# Patient Record
Sex: MALE | Race: WHITE
[De-identification: names, ages, dates, MRNs, and addresses within clinical notes are randomized per-mention and may not be internally consistent; named-entity substitution may affect disease eponyms.]

---

## 2020-02-04 ENCOUNTER — HOSPITAL ENCOUNTER (EMERGENCY)
Dept: HOSPITAL 1 - ED | Age: 65
Discharge: HOME | End: 2020-02-04
Payer: COMMERCIAL

## 2020-02-04 VITALS — BODY MASS INDEX: 28.9 KG/M2 | HEIGHT: 67 IN

## 2020-02-04 VITALS — DIASTOLIC BLOOD PRESSURE: 68 MMHG | SYSTOLIC BLOOD PRESSURE: 119 MMHG
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
## 2020-02-04 DIAGNOSIS — I50.9: ICD-10-CM

## 2020-02-04 DIAGNOSIS — Z88.0: ICD-10-CM
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
## 2020-02-04 DIAGNOSIS — I11.0: ICD-10-CM

## 2020-02-04 DIAGNOSIS — Z99.2: ICD-10-CM

## 2020-02-04 DIAGNOSIS — L29.9: ICD-10-CM
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
## 2020-02-04 DIAGNOSIS — E78.00: ICD-10-CM

## 2020-02-04 DIAGNOSIS — S90.31XA: Primary | ICD-10-CM
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
## 2020-02-04 DIAGNOSIS — E11.9: ICD-10-CM

## 2020-02-04 LAB
BASOPHILS NFR BLD: 0.2 % (ref 0–2)
BUN SERPL-MCNC: 78 MG/DL (ref 7–18)
CALCIUM SERPL-MCNC: 8 MG/DL (ref 8.5–10.1)
CHLORIDE SERPL-SCNC: 94 MMOL/L (ref 98–107)
CO2 SERPL-SCNC: 18.8 MMOL/L (ref 21–32)
CREAT SERPL-MCNC: 5.3 MG/DL (ref 0.7–1.3)
ERYTHROCYTE [DISTWIDTH] IN BLOOD BY AUTOMATED COUNT: 15.3 % (ref 11.5–14.5)
GFR SERPLBLD BASED ON 1.73 SQ M-ARVRAT: 12 ML/MIN
GLUCOSE SERPL-MCNC: 111 MG/DL (ref 74–106)
PLATELET # BLD: 135 X10^3MCL (ref 130–400)
POTASSIUM SERPL-SCNC: 4.7 MMOL/L (ref 3.5–5.1)
SODIUM SERPL-SCNC: 133 MMOL/L (ref 136–145)

## 2021-01-28 ENCOUNTER — HOSPITAL ENCOUNTER (INPATIENT)
Dept: HOSPITAL 1 - ED | Age: 66
LOS: 20 days | DRG: 870 | End: 2021-02-17
Attending: HOSPITALIST | Admitting: HOSPITALIST
Payer: COMMERCIAL

## 2021-01-28 VITALS
WEIGHT: 194.01 LBS | BODY MASS INDEX: 31.18 KG/M2 | WEIGHT: 194.01 LBS | HEIGHT: 66 IN | BODY MASS INDEX: 31.18 KG/M2 | HEIGHT: 66 IN

## 2021-01-28 VITALS — SYSTOLIC BLOOD PRESSURE: 116 MMHG | DIASTOLIC BLOOD PRESSURE: 74 MMHG

## 2021-01-28 DIAGNOSIS — D72.819: ICD-10-CM

## 2021-01-28 DIAGNOSIS — Z63.5: ICD-10-CM

## 2021-01-28 DIAGNOSIS — Z79.01: ICD-10-CM

## 2021-01-28 DIAGNOSIS — U07.1: ICD-10-CM

## 2021-01-28 DIAGNOSIS — I95.9: ICD-10-CM

## 2021-01-28 DIAGNOSIS — E11.21: ICD-10-CM

## 2021-01-28 DIAGNOSIS — D63.1: ICD-10-CM

## 2021-01-28 DIAGNOSIS — Z99.2: ICD-10-CM

## 2021-01-28 DIAGNOSIS — I45.10: ICD-10-CM

## 2021-01-28 DIAGNOSIS — Z79.4: ICD-10-CM

## 2021-01-28 DIAGNOSIS — R65.20: ICD-10-CM

## 2021-01-28 DIAGNOSIS — E11.22: ICD-10-CM

## 2021-01-28 DIAGNOSIS — Z88.0: ICD-10-CM

## 2021-01-28 DIAGNOSIS — A41.89: Primary | ICD-10-CM

## 2021-01-28 DIAGNOSIS — E44.1: ICD-10-CM

## 2021-01-28 DIAGNOSIS — F17.210: ICD-10-CM

## 2021-01-28 DIAGNOSIS — Z79.899: ICD-10-CM

## 2021-01-28 DIAGNOSIS — J12.82: ICD-10-CM

## 2021-01-28 DIAGNOSIS — N18.6: ICD-10-CM

## 2021-01-28 DIAGNOSIS — E87.1: ICD-10-CM

## 2021-01-28 DIAGNOSIS — N39.0: ICD-10-CM

## 2021-01-28 DIAGNOSIS — E87.6: ICD-10-CM

## 2021-01-28 DIAGNOSIS — Z98.49: ICD-10-CM

## 2021-01-28 DIAGNOSIS — D68.9: ICD-10-CM

## 2021-01-28 DIAGNOSIS — E78.5: ICD-10-CM

## 2021-01-28 DIAGNOSIS — K70.30: ICD-10-CM

## 2021-01-28 DIAGNOSIS — Z83.3: ICD-10-CM

## 2021-01-28 DIAGNOSIS — Z79.891: ICD-10-CM

## 2021-01-28 DIAGNOSIS — E78.00: ICD-10-CM

## 2021-01-28 DIAGNOSIS — Z66: ICD-10-CM

## 2021-01-28 DIAGNOSIS — J96.01: ICD-10-CM

## 2021-01-28 DIAGNOSIS — G93.40: ICD-10-CM

## 2021-01-28 DIAGNOSIS — I13.2: ICD-10-CM

## 2021-01-28 LAB
ALBUMIN SERPL-MCNC: 3 G/DL (ref 3.4–5)
ALP SERPL-CCNC: 67 U/L (ref 46–116)
ALT SERPL-CCNC: 60 U/L (ref 16–63)
AST SERPL-CCNC: 41 U/L (ref 15–37)
BILIRUB SERPL-MCNC: 1.37 MG/DL (ref 0.2–1)
BUN SERPL-MCNC: 18 MG/DL (ref 7–18)
CALCIUM SERPL-MCNC: 8.5 MG/DL (ref 8.5–10.1)
CHLORIDE SERPL-SCNC: 95 MMOL/L (ref 98–107)
CO2 SERPL-SCNC: 32.1 MMOL/L (ref 21–32)
CREAT SERPL-MCNC: 2 MG/DL (ref 0.7–1.3)
CRP SERPL-MCNC: 16.1 MG/DL (ref ?–0.9)
ERYTHROCYTE [DISTWIDTH] IN BLOOD BY AUTOMATED COUNT: 15 % (ref 12.1–16.2)
GFR SERPLBLD BASED ON 1.73 SQ M-ARVRAT: 36 ML/MIN
GLUCOSE SERPL-MCNC: 320 MG/DL (ref 74–106)
LDH SERPL-CCNC: 398 U/L (ref 100–190)
METAMYELOCYTES NFR BLD: 2 % (ref 0–2)
MICROSCOPIC UR-IMP: YES
MONOCYTES NFR BLD: 10 % (ref 0–7)
NEUTS BAND NFR BLD: 5 % (ref 0–10)
NEUTS SEG NFR BLD MANUAL: 69 % (ref 37–75)
PLAT MORPH BLD: (no result)
PLATELET # BLD: 77 X10^3MCL (ref 152–348)
POTASSIUM SERPL-SCNC: 3.2 MMOL/L (ref 3.5–5.1)
PROT SERPL-MCNC: 7.2 G/DL (ref 6.4–8.2)
RBC # UR STRIP.AUTO: NEGATIVE /UL
RBC MORPH BLD: NORMAL
SODIUM SERPL-SCNC: 137 MMOL/L (ref 136–145)
UA SPECIFIC GRAVITY: 1.01 (ref 1–1.03)

## 2021-01-28 PROCEDURE — P9047 ALBUMIN (HUMAN), 25%, 50ML: HCPCS

## 2021-01-28 PROCEDURE — G0378 HOSPITAL OBSERVATION PER HR: HCPCS

## 2021-01-28 PROCEDURE — U0003 INFECTIOUS AGENT DETECTION BY NUCLEIC ACID (DNA OR RNA); SEVERE ACUTE RESPIRATORY SYNDROME CORONAVIRUS 2 (SARS-COV-2) (CORONAVIRUS DISEASE [COVID-19]), AMPLIFIED PROBE TECHNIQUE, MAKING USE OF HIGH THROUGHPUT TECHNOLOGIES AS DESCRIBED BY CMS-2020-01-R: HCPCS

## 2021-01-28 PROCEDURE — A4628 OROPHARYNGEAL SUCTION CATH: HCPCS

## 2021-01-28 SDOH — SOCIAL STABILITY - SOCIAL INSECURITY: DISRUPTION OF FAMILY BY SEPARATION AND DIVORCE: Z63.5

## 2021-01-28 NOTE — NUR
PT LAYING SUPINE IN GURNEY IN A POSITION OF COMFORT WITH EYES CLOSED. PT EASILY
AROUSABLE TO VERBAL STIMULI. PT RESPIRATIONS E/U NO DISTRESS NOTED. PT REMAINS
CONNECTED TO FULL CM WILL CONTINUE TO MONITOR.

## 2021-01-29 VITALS — SYSTOLIC BLOOD PRESSURE: 125 MMHG | DIASTOLIC BLOOD PRESSURE: 69 MMHG

## 2021-01-29 VITALS — SYSTOLIC BLOOD PRESSURE: 148 MMHG | DIASTOLIC BLOOD PRESSURE: 72 MMHG

## 2021-01-29 VITALS — DIASTOLIC BLOOD PRESSURE: 69 MMHG | SYSTOLIC BLOOD PRESSURE: 130 MMHG

## 2021-01-29 VITALS — SYSTOLIC BLOOD PRESSURE: 130 MMHG | DIASTOLIC BLOOD PRESSURE: 62 MMHG

## 2021-01-29 VITALS — SYSTOLIC BLOOD PRESSURE: 113 MMHG | DIASTOLIC BLOOD PRESSURE: 48 MMHG

## 2021-01-29 LAB
BASOPHILS NFR BLD: 0.1 % (ref 0.2–1.5)
BUN SERPL-MCNC: 27 MG/DL (ref 7–18)
CALCIUM SERPL-MCNC: 8.2 MG/DL (ref 8.5–10.1)
CHLORIDE SERPL-SCNC: 98 MMOL/L (ref 98–107)
CO2 SERPL-SCNC: 33.2 MMOL/L (ref 21–32)
CREAT SERPL-MCNC: 2.3 MG/DL (ref 0.7–1.3)
ERYTHROCYTE [DISTWIDTH] IN BLOOD BY AUTOMATED COUNT: 14.9 % (ref 12.1–16.2)
GFR SERPLBLD BASED ON 1.73 SQ M-ARVRAT: 30 ML/MIN
GLUCOSE SERPL-MCNC: 321 MG/DL (ref 74–106)
MAGNESIUM SERPL-MCNC: 1.8 MG/DL (ref 1.8–2.4)
PHOSPHATE SERPL-MCNC: 3.9 MG/DL (ref 2.5–4.9)
PLATELET # BLD: 54 X10^3MCL (ref 152–348)
POTASSIUM SERPL-SCNC: 3.7 MMOL/L (ref 3.5–5.1)
RBC MORPH BLD: (no result)
SODIUM SERPL-SCNC: 139 MMOL/L (ref 136–145)

## 2021-01-29 NOTE — NUR
MONITOR DISPLAYED ELEVATED T WAVE. DR. WILSON AWARE AND CONSULT ORDERED WITH
DR. HOLMAN CARDIOLOGIST. TROPONIN AND ECG ORDER SUBMITED. PENDING RESULTS.

## 2021-01-29 NOTE — NUR
REPORT GIVEN TO NIGHT RN. PATIENT REMAINED STABLE THROUGHOUT MY SHIFT. ALL
QUESTIONS AND CONCERNS ADDRESSED. ALL NEEDS MET AT THIS TIME. ALL CARE IS
ENDORSED.

## 2021-01-29 NOTE — NUR
PT CAME IN WITH WORSENING SOB. PT HAS PRODUCTIVE COUGH. PT A/O X 4. PT ON NRB
AT 15L. PT SKIN IS INTACT. PT AMBULATORY WITH WALKER. PT VOIDS FREELY. PT
COMPLAINS OF DIARRHEA. PT CALL LIGHT ON EASY REACH. PT DENIES ANY PAIN AT THIS
MOMENT. CONTINUE TO MONITOR PT CLOSELY. PT IS ON DIALYSIS X 3 T/TH/SAT.

## 2021-01-29 NOTE — NUR
DR ARAUJO MADE AWARE OF PT HAVING ST ELEVATION. AWAITING ORDERS. ALL
QUESTIONS AND CONCERNS ADDRESSED.

## 2021-01-29 NOTE — NUR
RECEIVED PATIENT FROM NIGHT SHIFT RN. PATIENT IS ON DROPLET PRECAUTUINS DUE TO
(+) COVID. PATIENT A/O X 4 . PT IS ON TELE 24 AND 15L/MIN VIA NRB MASK.
REMAINS STABLE 93 SPO2, HR 93. R.WIRST IV SL. CDI AND PATENT. IN NO APPARENT
DISTRESS OR SOB AT THIS TIME. AL COMFORT AND SAFETY MEASURES IN PLACE. BED IN
LOWEST POSITION, LOCKED, SIDE RAILS UP X 2, AND CALL LIGHT LIGHT WITHIN REACH.
ALL QUESTIONS AND CONCERNS HAVE BEEN ADDRESSED.

## 2021-01-29 NOTE — NUR
PT A/O X 4. PT AWARE FOR CONVALASCENT BT. CONSENT IS SIGNED. PT IS DIALYSIS
PT SHCEDULED FOR T/TH/SAT. PT VS CHECKED AND RECORDED. PT IN NO APPARENT
DISTRESS. PT INSTRUCTED NOT TO MOVE ABRUPTLY TO AVOID WASTING OF O2 RESERVES.
PT IS AMBULATORY WITH WALKER. PT VOIDS FREELY. PT SKIN IS INTACT, PT HAS NO
EDEMA. PT ON TELE # 24 WITH NSR. PT HAS SALINE LOCK AT RIGHT WRIST G20. PT ON
NRB AT 15L. PT HAS LUEFT UPPER ARM AV SHUNT BRUIT AND THRILL ARE PRESENT.
ENDORSED TO INCOMING NURSE.

## 2021-01-29 NOTE — NUR
PATIENT WAS RESTING IN SIDE POSITION. DOING WELL ON 15 L NRB MASK AND 8L/MIN
NC WITH HUMIDIFIER 92 SPO2. DENIES ANY PAIN AT THIS TIME. PATIENT IS
REQUESTING ALLERGY MEDICATION. SPOKE TO SISTER NAHID AND SHE WILL CALL THE
PHARMACY TO CONFIRM RX AND CALL ME BACK WITH INFO. ALL SAFETY MEASURES IN
PLACE. ALL QUESTIONS AND CONCERNS WERE ADDRESSED. BED LOCKED, LOWEST POSITION,
BED RAILS UP X 2, AND CALL LIGHT WITHIN REACH.

## 2021-01-29 NOTE — NUR
PT AAOX4 SITTING UPRIGHT IN BED WATCHING TV.  PT C/O ITCHING. ADVISED RESIDENT
THAT PT REQUESTING ATARAX FOR ITCHING.  PT DIALYSIS PT TTS.  AAOX4.
TELEMONITOR IN USE. NSR NOTED. PULSES PRESENT. NO EDEMA NOTED.  LUNG SOUNDS
DIMINISHED. BOWEL SOUNDS PRESENT.  LAST BM 1/28.  VOIDS PER URINAL.
GENERALIZED WEAKNESS NOTED.  SKIN INTACT.  DENIES PAIN. IV SITE CDI; PATENT.
SAFETY MAINTAINED. BED IN LOWEST POSITION. CALL LIGHT WITHIN REACH. BED SIDE
TABLE NEAR.  WILL CONTINUE TO MONITOR.

## 2021-01-29 NOTE — NUR
REINFORCEMENT PROVIDED FOR INCENTIVE SPIROMETER USE AT BED SIDE. PATIENT
VERBALIZED UNDERSTANDING AND COOPERATED. IN NO APPARENT DISTRESS. DENIES ANY
PAIN. ALL QUESTIONS AND CONCERNS WERE ADDRESSED. BED IN LOCKED, LOWEST
POSITION, BED RAILS RAISED X 2, AND CALL LIGHT WITHIN REACH.

## 2021-01-29 NOTE — NUR
PATIENT EATING A SNACK AND DESATURATED TO 88 O2 ON 15L/MIN VIA NRB MASK.
RT AT BEDSIDE, ASSESSED PATIENT. EDUCATED PATIENT ON EATING SMALL BITES AT
A SLOWER PACE AND KEEPING NRB MASK ON. PATIENT VERBALIZED UNDERSTANDING.
SAFETY MEASURES IN PLACE. BED LOCKED, LOWET POSITION, ARM RAILS RAISED X 2,
AND CALL LIGHT WITHIN REACH. ALL QUESTIONS AND CONCERNS WERE ADDRESSED.

## 2021-01-30 VITALS — SYSTOLIC BLOOD PRESSURE: 130 MMHG | DIASTOLIC BLOOD PRESSURE: 70 MMHG

## 2021-01-30 VITALS — DIASTOLIC BLOOD PRESSURE: 72 MMHG | SYSTOLIC BLOOD PRESSURE: 153 MMHG

## 2021-01-30 VITALS — SYSTOLIC BLOOD PRESSURE: 131 MMHG | DIASTOLIC BLOOD PRESSURE: 66 MMHG

## 2021-01-30 VITALS — DIASTOLIC BLOOD PRESSURE: 50 MMHG | SYSTOLIC BLOOD PRESSURE: 109 MMHG

## 2021-01-30 LAB
BASOPHILS NFR BLD: 0 % (ref 0.2–1.5)
BUN SERPL-MCNC: 65 MG/DL (ref 7–18)
CALCIUM SERPL-MCNC: 7.9 MG/DL (ref 8.5–10.1)
CHLORIDE SERPL-SCNC: 98 MMOL/L (ref 98–107)
CO2 SERPL-SCNC: 28.8 MMOL/L (ref 21–32)
CREAT SERPL-MCNC: 3.5 MG/DL (ref 0.7–1.3)
CRP SERPL-MCNC: 13.1 MG/DL (ref ?–0.9)
ERYTHROCYTE [DISTWIDTH] IN BLOOD BY AUTOMATED COUNT: 14.5 % (ref 12.1–16.2)
GFR SERPLBLD BASED ON 1.73 SQ M-ARVRAT: 19 ML/MIN
GLUCOSE SERPL-MCNC: 233 MG/DL (ref 74–106)
MAGNESIUM SERPL-MCNC: 1.9 MG/DL (ref 1.8–2.4)
PHOSPHATE SERPL-MCNC: 4.2 MG/DL (ref 2.5–4.9)
PLATELET # BLD: 74 X10^3MCL (ref 152–348)
POTASSIUM SERPL-SCNC: 3.9 MMOL/L (ref 3.5–5.1)
RBC MORPH BLD: NORMAL
SODIUM SERPL-SCNC: 135 MMOL/L (ref 136–145)

## 2021-01-30 PROCEDURE — 5A1D70Z PERFORMANCE OF URINARY FILTRATION, INTERMITTENT, LESS THAN 6 HOURS PER DAY: ICD-10-PCS | Performed by: HOSPITALIST

## 2021-01-30 NOTE — NUR
SPOKE TO TASNEEM DIALYSIS RN, 2L OUT AND HE STATED TO MONITOR FOR BLEEDING TO PAUL
ACCESS. ALL QUESTIONS AND CONCERNS ADDRESSED.

## 2021-01-30 NOTE — NUR
RT AT BESIDE ASSESSED PATIENT AND HE IS NOW ON 15L/MIN NRB MASK AND 15L/MIN
VIA NC WITH HUMIDIFIER. PATIENT 90 SPO2 AND STABLE. IN NO APPARENT DISTRESS.
BED LOCKED IN LOWEST POSITION, BED RAILS RAISED X 2, AND CALL LIGHT WITHIN
REACH. ALL UESTIONS AND CONCERNS ADDRESSED AT THIS TIME.

## 2021-01-30 NOTE — NUR
RECEIVED REPORT FROM NIGHT SHIFT NURSE. PATIENT IS ON DROPLET FOR COVID (+).
IN NO APPARENT DISTRESS AND DENIES ANY PAIN AT THIS TIME. PATEINT ON 15L/MIN
VIA NRB MASK AND NC AND HUMIDIFIER 8L/MIN 91 SPO2.  RT. WRIST CDI AND
PATENT. TELE 24. ALL QUESTIONS AND CONCERNS WERE ADDRESSED. BED LOCKED, IN
LOWEST POSITION, BED RAILS UP X 2, AND CALL LIGHT WITHIN REACH.

## 2021-01-30 NOTE — NUR
PATIENT RECIEVING HD IN NO APPARENT DISTRESS. DENIES ANY PAIN OR SOB. ON
15L/MIN NRB MASK AND 15 L/MIN NC WITH HUMIDIFIER 93 SPO2 HE 87. SAFETY
PRECAUTIONS IN PLACE. BED IN LOCKED, LOWEST POSITION, BED RAILS UP X 2, CALL
LIGHT WITHIN REACH. ALL QUESTIONS AND CONCERNS WERE ADDRESSED.

## 2021-01-30 NOTE — NUR
PT RESTED THROUGOUT THE NIGHT. DENIES PAIN. C/O ITCHING THAT WAS RELIEVED WITH
PO MEDICATION.  IV SITE CDI; PATENT. APPETITE GOOD. TOLERATED MEDS WELL.
SAFETY MAINTAINED.  BG ELEVATED COVERED PER SLIDING SCALE.  WILL CONTINUE TO
MONITOR.

## 2021-01-30 NOTE — NUR
RECEIVED PT IN BED. ALERT AND ORIENTED. ON 15L NC AND 15 NRB. NO ACUTE
DISTRESS. RESPIRATIONS EVEN AND UNLABORED. CALL LIGHT WITHIN REACH. SIDE RAILS
UP X2. BED IN LOWEST POSITION. BRAKES ON.

## 2021-01-30 NOTE — NUR
PATIENT REMAINED STABLE THROUGHOUT SHIFT. CONTINUES TO BE ON 15L/MIN VIA NRB
MASK AND 15L/MIN NC W/ BUBBLE 90 SPO2. BED IN LOCKED, LOWEST POSITION, BED
RAILS RAISED X 2, AND CALL LIGHT WITHIN REACH. REPORT GIVEN TO NIGHT SHIFT RN.
ALL CARE HAS BEEN ENDORSED. ALL QUESTIONS ANC CONCERNS WERE ADDRESSED AT THIS
TIME.

## 2021-01-30 NOTE — NUR
PATIENT REQUESTED HIS HOME MEDICATION LASIX BE RESUMED. CALLED AMIRA NP.
AMIRA RESUMED LASIX AS OF TOMORROW AS PT HAD HD TODAY. ALL QUESTIONS AND
CONCERNS WERE ADDRESSED AT THIS TIME.

## 2021-01-30 NOTE — NUR
PT AAOX4 SITTING UPRIGHT IN BED. NRB IN USE. NO SOB NOTED.  PT C/O COUGH/WILL
ADDRESS CONCERNS.  TOLERATED MEDS WELL. BG ELEVATED COVERAGED WITH SLIDING
SCALE.  SAFETY MAINTAINED. WILL CONTINUE TO MONITOR.

## 2021-01-30 NOTE — NUR
MADE NP AMIRA AWARE OF PT C/O EARACHE AND REQUESTING TYLENOL PO. NP AMIRA
GAVE VERBAL ORDER FOR TYLENOL 650 MG PO Q4H. ORDER CONFIRMED AND READ BACK.
ALL QUESTIONS AND CONCERNS ADDRESSED.

## 2021-01-30 NOTE — NUR
IN TO SEE PT. PT ACCESS SITE DRESSING TO PAUL, POST DIALYSIS, CDI. ALL
QUESTIONS AND CONCERNS ADDRESSED.

## 2021-01-31 VITALS — SYSTOLIC BLOOD PRESSURE: 114 MMHG | DIASTOLIC BLOOD PRESSURE: 58 MMHG

## 2021-01-31 VITALS — SYSTOLIC BLOOD PRESSURE: 118 MMHG | DIASTOLIC BLOOD PRESSURE: 60 MMHG

## 2021-01-31 VITALS — DIASTOLIC BLOOD PRESSURE: 62 MMHG | SYSTOLIC BLOOD PRESSURE: 123 MMHG

## 2021-01-31 VITALS — SYSTOLIC BLOOD PRESSURE: 129 MMHG | DIASTOLIC BLOOD PRESSURE: 71 MMHG

## 2021-01-31 VITALS — SYSTOLIC BLOOD PRESSURE: 125 MMHG | DIASTOLIC BLOOD PRESSURE: 64 MMHG

## 2021-01-31 LAB
BASOPHILS NFR BLD: 0 % (ref 0.2–1.5)
BUN SERPL-MCNC: 60 MG/DL (ref 7–18)
CALCIUM SERPL-MCNC: 8 MG/DL (ref 8.5–10.1)
CHLORIDE SERPL-SCNC: 98 MMOL/L (ref 98–107)
CO2 SERPL-SCNC: 26.3 MMOL/L (ref 21–32)
CREAT SERPL-MCNC: 3.2 MG/DL (ref 0.7–1.3)
ERYTHROCYTE [DISTWIDTH] IN BLOOD BY AUTOMATED COUNT: 14.4 % (ref 12.1–16.2)
GFR SERPLBLD BASED ON 1.73 SQ M-ARVRAT: 21 ML/MIN
GLUCOSE SERPL-MCNC: 268 MG/DL (ref 74–106)
PLATELET # BLD: 94 X10^3MCL (ref 152–348)
POTASSIUM SERPL-SCNC: 3.6 MMOL/L (ref 3.5–5.1)
RBC MORPH BLD: NORMAL
SODIUM SERPL-SCNC: 135 MMOL/L (ref 136–145)

## 2021-01-31 NOTE — NUR
PT REMAINED STABLE THROUGHOUT THE SHIFT. CONTINUES ON 15 L/MIN NRB MASK AND
15L/MIN VIA NC WITH HUMIDIFIER 9094 SPO2. DENIES ANY CHEST  OR OTHER PAIN PAIN
IN NAD. PATIENT RESTING IN LOW FOWLERS POSTITION. RT WRIST IV, CDI AND
PATENT.  REPORT GIVEN TO NIGHT SHIFT RN. ALL QUESTIONS AND CONCERNS ADDRESSED
AT THIS TIME. BED IN LOCKED, LOWEST POSITON, BED RAILS RAISED X 2. ALL CARE
HAS BEEN ENDORESED.

## 2021-01-31 NOTE — NUR
PT RESTING IN BED. NO ACUTE DISTRESS. RESPIRATIONS EVEN AND UNLABORED. ALL
NEEDS ADDRESSED. CALL LIGHT WITHIN REACH. SIDE RAILS UP X2. BED IN LOWEST
POSITION. BRAKES ON.

## 2021-01-31 NOTE — NUR
PATIENT RESTING IN LOW FOWLERS POSITION. IN NO APPARENT DISTRESS. PATIENT
RECIEVED TYLENOL 650MG FOR MCALLISTER AND L EAR PAIN at 0943. REASSED AT 10:43 AND
PT STATED THE PAIN HAD RESOLVED. TECH CAME IN TO PERFORM ECHO. PATIENT
AGREEED. BED IN LOWEST, LOCKED POSIITON, BED RAILS RAISED X 2, AND CALL LIGHT
WITHIN REACH. ALL QUESTIONS AND CONCERNS WERE ADDRESSED AT THIS TIME.

## 2021-01-31 NOTE — NUR
SPOKE TO PT SISTER NAHID [phone]. GAVE STATUS UPDATE AND ALL QUESTIONS
AND CONCERNS WERE ADDRESSED.

## 2021-02-01 VITALS — DIASTOLIC BLOOD PRESSURE: 61 MMHG | SYSTOLIC BLOOD PRESSURE: 109 MMHG

## 2021-02-01 VITALS — DIASTOLIC BLOOD PRESSURE: 56 MMHG | SYSTOLIC BLOOD PRESSURE: 114 MMHG

## 2021-02-01 VITALS — SYSTOLIC BLOOD PRESSURE: 127 MMHG | DIASTOLIC BLOOD PRESSURE: 59 MMHG

## 2021-02-01 VITALS — DIASTOLIC BLOOD PRESSURE: 67 MMHG | SYSTOLIC BLOOD PRESSURE: 139 MMHG

## 2021-02-01 VITALS — DIASTOLIC BLOOD PRESSURE: 64 MMHG | SYSTOLIC BLOOD PRESSURE: 112 MMHG

## 2021-02-01 LAB
BASOPHILS NFR BLD: 0.3 % (ref 0.2–1.5)
BUN SERPL-MCNC: 92 MG/DL (ref 7–18)
CALCIUM SERPL-MCNC: 7.8 MG/DL (ref 8.5–10.1)
CHLORIDE SERPL-SCNC: 96 MMOL/L (ref 98–107)
CO2 SERPL-SCNC: 23 MMOL/L (ref 21–32)
CREAT SERPL-MCNC: 4.1 MG/DL (ref 0.7–1.3)
ERYTHROCYTE [DISTWIDTH] IN BLOOD BY AUTOMATED COUNT: 14.1 % (ref 12.1–16.2)
GFR SERPLBLD BASED ON 1.73 SQ M-ARVRAT: 16 ML/MIN
GLUCOSE SERPL-MCNC: 165 MG/DL (ref 74–106)
PLATELET # BLD: 104 X10^3MCL (ref 152–348)
POTASSIUM SERPL-SCNC: 3.8 MMOL/L (ref 3.5–5.1)
RBC MORPH BLD: NORMAL
SODIUM SERPL-SCNC: 133 MMOL/L (ref 136–145)

## 2021-02-01 NOTE — NUR
PT RESTING IN BED. NO ACUTE DISTRESS. CALL LIGHT WITHIN REACH. SIDE RAILS UP
X2. BED IN LOWEST POSITION. BRAKES ON.

## 2021-02-01 NOTE — NUR
REPORT TAKEN FROM NIGHT SHIFT NURSE, PER REPORT THE PATIENT IS AWAKE AND ALERT
AND ABLE TO MAKE NEEDS KNOWN IN NO DISTRESS AT THIS TIME WILL CONTINUE TO
MONITOR

## 2021-02-02 VITALS — SYSTOLIC BLOOD PRESSURE: 133 MMHG | DIASTOLIC BLOOD PRESSURE: 68 MMHG

## 2021-02-02 VITALS — DIASTOLIC BLOOD PRESSURE: 51 MMHG | SYSTOLIC BLOOD PRESSURE: 138 MMHG

## 2021-02-02 VITALS — SYSTOLIC BLOOD PRESSURE: 120 MMHG | DIASTOLIC BLOOD PRESSURE: 62 MMHG

## 2021-02-02 VITALS — SYSTOLIC BLOOD PRESSURE: 123 MMHG | DIASTOLIC BLOOD PRESSURE: 60 MMHG

## 2021-02-02 VITALS — SYSTOLIC BLOOD PRESSURE: 109 MMHG | DIASTOLIC BLOOD PRESSURE: 67 MMHG

## 2021-02-02 LAB
BASOPHILS NFR BLD: 0.1 % (ref 0.2–1.5)
BUN SERPL-MCNC: 123 MG/DL (ref 7–18)
CALCIUM SERPL-MCNC: 7.5 MG/DL (ref 8.5–10.1)
CHLORIDE SERPL-SCNC: 96 MMOL/L (ref 98–107)
CO2 SERPL-SCNC: 21.5 MMOL/L (ref 21–32)
CREAT SERPL-MCNC: 4.8 MG/DL (ref 0.7–1.3)
ERYTHROCYTE [DISTWIDTH] IN BLOOD BY AUTOMATED COUNT: 14.3 % (ref 12.1–16.2)
GFR SERPLBLD BASED ON 1.73 SQ M-ARVRAT: 13 ML/MIN
GLUCOSE SERPL-MCNC: 153 MG/DL (ref 74–106)
PLATELET # BLD: 94 X10^3MCL (ref 152–348)
POTASSIUM SERPL-SCNC: 3.6 MMOL/L (ref 3.5–5.1)
SODIUM SERPL-SCNC: 132 MMOL/L (ref 136–145)

## 2021-02-02 PROCEDURE — 5A1D70Z PERFORMANCE OF URINARY FILTRATION, INTERMITTENT, LESS THAN 6 HOURS PER DAY: ICD-10-PCS | Performed by: HOSPITALIST

## 2021-02-02 NOTE — NUR
NURSE REPORT
 
Report obtained from day nurse Rinku and this nurse assume care of patient
until 0730. Received patient awake. VSS. Afeb. No c/o pain or discomfort. 15 l
O2via NRB mask and O2 at 15 l/min humidied O2. O2 sat 95%. HS BG- 267 and
given insulin as ordered. This am, BG- 146 and no insulin needed. Voids in
urinal and has BM on bedpan. Tele monitor with SR with BBB 71.
Lorie Hernandez RN

## 2021-02-02 NOTE — NUR
HD DONE, 2.5L FLUID WAS REMOVED BY DIALYSIS.INFORMED NP ASHKAN ABOUT 
CONVALESCENT PLASMA, NEW ORDER OBTAINED. WILL CONTINUE TO FOLLOW UP.

## 2021-02-02 NOTE — NUR
PT AWAKE, TYPE AND SCREEN OBTAINED AND PENDING CONVALESCENT PLASMA
TRANSFUSION. CONSENT SIGNED, WILL START TRANSFUSION ONCE PLASMA IS READY.

## 2021-02-02 NOTE — NUR
PT A/O X3, STARTED CONVALESCENT PLASMA ON 1840, VSS. IV INFILTRATED AROUND
1900. ENDORSED TO NIGHT SHIFT AND WILL CONTINUE TO MONITOR PATIENT.

## 2021-02-02 NOTE — NUR
PT A/O X4, GENERALIZED WEAKNESS NOTED.CURRENTLY HAVING HD, APPETITE IS FAIR.
UPDATED DIETICIAN ABOUT ORAL INTAKE, REQUESTED NUTRITIONAL SUPPLEMENT.
CONVALESCENT PLASMA ORDER , WILL FOLLOW UP AND CONTINUE TO MONITOR
PATIENT.

## 2021-02-02 NOTE — NUR
1. Continue with CCHO and Renal diet
2. Recommend Nepro BID for additional 850kcal and 38.2g protein to aid PO
intake.

## 2021-02-02 NOTE — NUR
RECIEVED PATIENT FROM NIGHT LAURITA SALEH. PT AAO X4, ON 15L NRM AND 15L
HUMIDIFIED O2. MILD SOB NOTED WITH EXERTION. C/O MILD CHEST PAIN WITH COUGH.
ABLE TO TAKE ORAL MEDS, TOLERATED WELL. POC ONGING, WILL CONTINUE TO MONITOR.

## 2021-02-02 NOTE — NUR
Initial Nutrition Assessment: 220B FABIO SANDERS 65M HR
 
Nursing trigger: N/V/D> 3days, unintentional weight loss > 10 lbs
Dx: Bilateral PNA, Hypoxemia, COVID positive
PMHx: ESRD on HD (TThSAT), DM, HTN
PSHx: none noted
Labs: (2/2) H/H 12.4/36L, Na 132L, Cl 96L, BUN 123H, Cr 4.8H, BG 153H, POC BG
146H, Ca 7.5L, (1/30) CRP 13.1H, (1/28) Albumin 3L, *no new lipid panel
Meds: Prilosec, Humulin, Atarax, Colace, Mucinex, Tylenol, Decadron, Norvasc,
Nephro-mirian, Lasix, Zinc sulfate, vitamin D, Lipitor, neurotin, vitamin C,
Ambien
Diet: [facility] renal diet
PO intake since admission: (1/29) B: 100%, (2/1) B: 100%, L: 80%,
Ht: 167.64cm/66in Wt: 75.75kg/167lbs BMI: 27kg/m2 Bed scale:
IBW: 64.55kg/142lbs  %IBW: 117.36% UBW:
Age: 65
Food Allergies: None reported by pt per RN
Edema: none noted
Last BM: 2/2 per RN
Skin: skin intact
Harrison: 16
PCR positive
Per H and P (1/28), patient is a 65-year-old  male with history of
hypertension, diabetes, ESRD on dialysis (TThSat), who presents to the ED for
shortness of breath. The shortness of breath started earlier in the day when
he woke up. He also has a mild cough and chest pain which is associated with
breathing. He also reports having 1 episode of non-bloody, non-mucoid diarrhea
today. He says that there are couple of people in his family who have been
tested covid positive and are the most likely source of his infection. He has
not been tested for covid until today in [place] ED with was positive. He was
found to be saturating at 71% on RA and was started on 15L NRB.  He reports
having dialysis today for 4 hours. He denies any fevers, chills, nausea,
vomiting, dysuria, hematuria, body aches, loss of taste/smell, sick contacts,
cough, or other complaints.
Pt was admitted with dx: Sepsis and acute hypoxic respiratory failure 2/2
COVID PNA, ESRD on HD, HTN, hypokalemia, DMOOC, normocytic anemia,
transaminitis, Mild malnutrition
 
RD Note (2/2/2021)
Per progress note (2/1) pt on 15L NRM. Per pt's RN, pt was tired this morning
and did not have anything from breakfast. Pt received HD today, and pt is also
on two oxygen apparatus according to RN. Pt otherwise did not exhibit signs of
GI distress or chewing/swallowing difficulties. RN mentioned that pt will
benefit from ONS to aid PO intake.
 
Problem with:  N/V/D/C: none per RN
Problems with: Chewing:  Swallowing: none per RN
Current appetite: Poor this morning, good appetite on 1/29 and 2/1 per
flowsheet
Recent wt change: not able to obtain %wt change: not able to obtain
Vitamin/Supplement use: Dialyvite
Special diet at home: Regular diet per admission system assessment
Physical activity: not able to obtain
Nutrition education given (specify specific nutrition education and handout
given): not given at this time d/t pt in isolation.
 
Food-drug interactions? Education given? n/a
 
Estimated Nutritional Needs Based on ideal body weight (65kg)
Energy: 5074-2717 kcal/day (30-35 kcal/kg for COVID positive and HD)
Protein: 78-91 g/day (1.2-1.4 g/kg for COVID and HD)
Fluid: 500-1000 mL + HD output/day or per MD
 
Nutrition Diagnosis:
1. Increased energy and protein needs r/t viral infection and increased
expenditure a/e/b pt is COVID positive and has ESRD on HD.
 
Intervention
1. Continue with CCHO and Renal diet
2. Recommend Nepro BID for additional 850kcal and 38.2g protein to aid PO
intake.
 
Monitor/Evaluate
Goal: PO intake at least 75% of estimated needs
Monitor: PO intake, Labs, GI function, ONS intake
F/U in 3-5 days as moderate risk 2/5-7

## 2021-02-02 NOTE — NUR
RECEIVED PATIENT FROM NIGHT LAURITA SALEH. PATIENT A/O X4, C/O GENERALIZED
WEAKNESS AND LOSS OF APPETITE. NOTED MILD SOB WITH EXERTION. ON 40L HIGH FLOW
WITH 100% FIO2 AND 15L OF NRM. NEED MINIMUM ASSISTANCE WITH ADL. NO CHEST PAIN
COMPALINED AT THE MOMENT. WILL CONTINUE THE POC AND MONITOR PATIENT.

## 2021-02-02 NOTE — NUR
2000 RECEIVED PT ON SEMI FOWLERS POSITION; NOT IN RESP DISTRESS; OBTAINED IV
ACCESS IN RT WRIST AND RESUMED PLASMA; @2155 BT COMPLETED WELL TOLERATED AND
NO BT REACTION PRESENTED; KEPT CALLLIGHT IN REACH.

## 2021-02-03 VITALS — DIASTOLIC BLOOD PRESSURE: 72 MMHG | SYSTOLIC BLOOD PRESSURE: 127 MMHG

## 2021-02-03 VITALS — DIASTOLIC BLOOD PRESSURE: 60 MMHG | SYSTOLIC BLOOD PRESSURE: 118 MMHG

## 2021-02-03 VITALS — SYSTOLIC BLOOD PRESSURE: 142 MMHG | DIASTOLIC BLOOD PRESSURE: 70 MMHG

## 2021-02-03 VITALS — DIASTOLIC BLOOD PRESSURE: 49 MMHG | SYSTOLIC BLOOD PRESSURE: 105 MMHG

## 2021-02-03 VITALS — SYSTOLIC BLOOD PRESSURE: 109 MMHG | DIASTOLIC BLOOD PRESSURE: 67 MMHG

## 2021-02-03 LAB
BASOPHILS NFR BLD: 0 % (ref 0.2–1.5)
BUN SERPL-MCNC: 99 MG/DL (ref 7–18)
CALCIUM SERPL-MCNC: 7.5 MG/DL (ref 8.5–10.1)
CHLORIDE SERPL-SCNC: 98 MMOL/L (ref 98–107)
CO2 SERPL-SCNC: 23 MMOL/L (ref 21–32)
CREAT SERPL-MCNC: 4.3 MG/DL (ref 0.7–1.3)
ERYTHROCYTE [DISTWIDTH] IN BLOOD BY AUTOMATED COUNT: 14.4 % (ref 12.1–16.2)
GFR SERPLBLD BASED ON 1.73 SQ M-ARVRAT: 15 ML/MIN
GLUCOSE SERPL-MCNC: 340 MG/DL (ref 74–106)
PLATELET # BLD: 65 X10^3MCL (ref 152–348)
POTASSIUM SERPL-SCNC: 3.7 MMOL/L (ref 3.5–5.1)
SODIUM SERPL-SCNC: 133 MMOL/L (ref 136–145)

## 2021-02-03 PROCEDURE — 02HV33Z INSERTION OF INFUSION DEVICE INTO SUPERIOR VENA CAVA, PERCUTANEOUS APPROACH: ICD-10-PCS | Performed by: NURSE PRACTITIONER

## 2021-02-03 NOTE — NUR
0630  MG/DL ISS APPLIED AS DIRECTED; DENIES ANY DISCOMFORT; NOT IN RESP
DISTRESS; DUE MEDS ADMINISTERED; FOR ENDORSEMENT.

## 2021-02-03 NOTE — NUR
PATIENT RESTING COMFORTABLY, NO C/O PAIN OR DISCOMFORT. RESPIRATIONS ARE
NON-LABORED. SKIN IS CLEAN, WARM AND DRY TO TOUCH. IV ACCESS IS PATENT,
SECURE, NO S/SX OF REDNESS OR SWELLING. BED IS LOCKED IN LOWEST POSITION, CALL
LIGHT IN REACH. PATIENT ENDORSED TO NIGHT SHIFT NURSE.

## 2021-02-03 NOTE — NUR
PT A/A/O X3. DENIES DIZZINESS AND HEADACHE. BREATH SOUNDS DIMINISHED ARACELI LUNGS
BREATHING EVEN AND UNLABORED ON 15L 100% NON REBREATHER MASK AND HI FLOW AT
35%, FIO2 100%, SPO2 96%. DENIES CHEST PAIN AND PRESSURE. BOWEL SOUNDS ACTIVE.
NO C/O N/V AND ABD PAIN. AV SHUNT NOTED ON THE PAUL WITH POSITIVE BRUIT AND
THRILL. ECHYMOSIS NOTED ON THE RUE. SALINE LOCK NOTED ON THE RIGHT WRIST. MADE
PT COMFORTABLE. PLACED CALL LIGHT WITH IN REACH. WILL CONTINUE TO MONITOR.

## 2021-02-03 NOTE — NUR
PATIENT RESTING COMFORTABLY WATCHING TELEVISION, NO C/O PAIN OR DISCOMFORT.
RESPIRATIONS ARE NON-LABORED. SKIN IS CLEAN, WARM, DRY TO TOUCH. BED IS LOCKED
IN LOWEST POSITION, CALL LIGHT IN REACH. NURSE WILL CONTINUE TO MONITOR FOR
CHANGES IN STATUS.

## 2021-02-03 NOTE — NUR
RECEIVED PATIENT IN BED, AWAKE, RESTING COMFORTABLY. RESPIRATIONS ARE
NON-LABORED. SKIN IS CLEAN, WARM AND DRY TO TOUCH. IV ACCESS IS PATENT, DRY
AND SECURE. BED IS LOCKED IN LOWEST POSITION, CALL LIGHT IN REACH. NURSE WILL
CONTINUE CARE AND MONITOR FOR CHANGES IN STATUS.

## 2021-02-03 NOTE — NUR
PATIENT RESTING COMFORTABLY, NO C/O PAIN OR DISCOMFORT. RESPIRATIONS ARE
NON-LABORED. SKIN IS CLEAN, WARM AND DRY TO TOUCH. PATIENT IS MEDICATION
COMPLIANT. NO S/SX OF ADVERSE REACTIONS NOTED. BED IS LOCKED IN LOWEST
POSITION, CALL LIGHT IN REACH. NURSE WILL CONTINUE TO MONITOR FOR CHANGES IN
STATUS.

## 2021-02-04 VITALS — DIASTOLIC BLOOD PRESSURE: 71 MMHG | SYSTOLIC BLOOD PRESSURE: 117 MMHG

## 2021-02-04 VITALS — SYSTOLIC BLOOD PRESSURE: 118 MMHG | DIASTOLIC BLOOD PRESSURE: 72 MMHG

## 2021-02-04 VITALS — SYSTOLIC BLOOD PRESSURE: 112 MMHG | DIASTOLIC BLOOD PRESSURE: 76 MMHG

## 2021-02-04 VITALS — DIASTOLIC BLOOD PRESSURE: 72 MMHG | SYSTOLIC BLOOD PRESSURE: 132 MMHG

## 2021-02-04 LAB
BASOPHILS NFR BLD: 0.1 % (ref 0.2–1.5)
BUN SERPL-MCNC: 128 MG/DL (ref 7–18)
CALCIUM SERPL-MCNC: 8.1 MG/DL (ref 8.5–10.1)
CHLORIDE SERPL-SCNC: 93 MMOL/L (ref 98–107)
CO2 SERPL-SCNC: 20.5 MMOL/L (ref 21–32)
CREAT SERPL-MCNC: 5.5 MG/DL (ref 0.7–1.3)
ERYTHROCYTE [DISTWIDTH] IN BLOOD BY AUTOMATED COUNT: 14 % (ref 12.1–16.2)
GFR SERPLBLD BASED ON 1.73 SQ M-ARVRAT: 11 ML/MIN
GLUCOSE SERPL-MCNC: 390 MG/DL (ref 74–106)
MAGNESIUM SERPL-MCNC: 1.9 MG/DL (ref 1.8–2.4)
PHOSPHATE SERPL-MCNC: 5.2 MG/DL (ref 2.5–4.9)
PLATELET # BLD: 70 X10^3MCL (ref 152–348)
POTASSIUM SERPL-SCNC: 3.8 MMOL/L (ref 3.5–5.1)
SODIUM SERPL-SCNC: 130 MMOL/L (ref 136–145)

## 2021-02-04 PROCEDURE — 5A1D70Z PERFORMANCE OF URINARY FILTRATION, INTERMITTENT, LESS THAN 6 HOURS PER DAY: ICD-10-PCS | Performed by: HOSPITALIST

## 2021-02-04 NOTE — NUR
RECEIVED PT FROM AM NURSE. A/OX4, FOLLOWS COMMANDS, SPEECH CLEAR. TELE 24,
NSR, HR 93. DENIES CHEST PAIN, PRESSURE, AND PALPITATIONS. ON 15L NRB AND HI
FLOW 35L FIO2 100%, SPO2 94%. BREATHING LABORED AT 24. COUGH NOTED. BS ACTIVE
X4 QUADS, ABD SOFT AND NONTENDER. GENERALIZED WEAKNESS NOTED. AV SHUNT TO PAUL.
IV R WRIST, SL. MADE PT COMFORTABLE IN BED. BED IN LOW POSITION. CALL LIGHT
LIGHT WITHIN REACH. WILL CONTINUE TO MONITOR.

## 2021-02-04 NOTE — NUR
PATIENT UNDERGOING HD, NO C/O PAIN OR DISCOMFORT. RESPIRATIONS ARE
NON-LABORED. SKIN IS CLEAN, WARM AND DRY TO TOUCH. IV ACCESS IS PATENT, DRY
AND SECURE. BED IS LOCKED IN LOWEST POSITION, CALL LIGHT IN REACH. NURSE WILL
CONTINUE TO MONITOR FOR CHANGES IN STAUS.

## 2021-02-04 NOTE — NUR
PT RESTED WELL THROUGHOUT THE NIGHT WITH OXYGEN IN USE.  SAFETY MAINTAINED. BG
ELEVATED. COVERED PER SLIDING SCALE. WILL CONTINUE TO MONITOR.

## 2021-02-04 NOTE — NUR
PATIENT RESTING COMFORABLY, NO C/O PAIN OF PAIN OR DISCOMFORT. RESPIRATIONS
ARE NON-LABORED, SKIN IS CLEAN, WARM DRY TO TOUCH. IV ACCESS IS PATENT, DRY
AND SECURE. BED IS LOCKED IN LOWEST POSITION, CALL LIGHT IN REACH. PATIENT
ENDORSED TO NIGHT SHIFTNURSE

## 2021-02-05 VITALS — DIASTOLIC BLOOD PRESSURE: 69 MMHG | SYSTOLIC BLOOD PRESSURE: 118 MMHG

## 2021-02-05 VITALS — DIASTOLIC BLOOD PRESSURE: 66 MMHG | SYSTOLIC BLOOD PRESSURE: 131 MMHG

## 2021-02-05 VITALS — DIASTOLIC BLOOD PRESSURE: 50 MMHG | SYSTOLIC BLOOD PRESSURE: 109 MMHG

## 2021-02-05 VITALS — DIASTOLIC BLOOD PRESSURE: 41 MMHG | SYSTOLIC BLOOD PRESSURE: 133 MMHG

## 2021-02-05 LAB
BASOPHILS NFR BLD: 0.1 % (ref 0.2–1.5)
BUN SERPL-MCNC: 102 MG/DL (ref 7–18)
CALCIUM SERPL-MCNC: 7.7 MG/DL (ref 8.5–10.1)
CHLORIDE SERPL-SCNC: 98 MMOL/L (ref 98–107)
CO2 SERPL-SCNC: 25.8 MMOL/L (ref 21–32)
CREAT SERPL-MCNC: 4.7 MG/DL (ref 0.7–1.3)
ERYTHROCYTE [DISTWIDTH] IN BLOOD BY AUTOMATED COUNT: 14.2 % (ref 12.1–16.2)
GFR SERPLBLD BASED ON 1.73 SQ M-ARVRAT: 13 ML/MIN
GLUCOSE SERPL-MCNC: 268 MG/DL (ref 74–106)
PLATELET # BLD: 67 X10^3MCL (ref 152–348)
POTASSIUM SERPL-SCNC: 4.2 MMOL/L (ref 3.5–5.1)
SODIUM SERPL-SCNC: 135 MMOL/L (ref 136–145)

## 2021-02-05 NOTE — NUR
CHEST XRAY CONFIRMED CORRECT PLACEMENT OF PICC LINE DOUBLE LUMEN.  PICC LINE
NURSE STATED PICC LINE OK TO USE. FLUSHED BOTH LUMEN WITH NO DIFFICULTY AND
CAPPED.

## 2021-02-05 NOTE — NUR
PT ASLEEP IN BED, EASILY AROUSABLE. DENIES PAIN AT THIS TIME. ON 15L NRB AND
HIFLOW 35L, FIO2 100%, NO RESPIRATORY DISTRESS AT THIS TIME. SPO2 97%. ALL
NEEDS MET. BED IN LOW POSITION. CALL LIGHT WITHIN REACH. WILL CONTINUE TO
MONITOR.

## 2021-02-05 NOTE — NUR
RECEIVED PT FROM DAY SHIFT RN. PT IS ALERT AND ORIENTED X4. PT HAS DIMINISHED
BREATH SOUNDS. NO ACUTE DISTRESS NOTED. PT DENIES ANY CHEST PAIN. PT IS
CURRENTLY ON 35L HIFLOW AND 15L NRB WITH O2 SAT 93%. PT HAS A COUGH. PROVIDED
PT WITH MUCINEX. PT HAS A RIGHT WRIST IV AND RIGHT PICC DOUBLE LUMEN. PT HAS
LEFT SHUNT FOR DIALYSIS. PT IS COVID+. DROPLET ISOLATION IN PLACE AND
FOLLOWED. ALL SAFETY MEASURES IN PLACE. BED IN LOWEST POSITION, 2 SIDE RAILS
UP. CALL LIGHT WITHIN REACH. WILL CONTINUE TO MONITOR.

## 2021-02-05 NOTE — NUR
PT CALM AND COMFORTABLE IN BED,  DENIES PAIN AT THIS TIME. ON 15L NRB %
FIO2 NO RESPIRATORY DISTRESS NOTED AT THIS TIME. O2 SATURATION AT 91-92%
ALL NEEDS MET. BED AT LOWEST POSITION. CALL LIGHT WITHIN REACH.  IV SITE TO RT
WRIST CDI. PICC LINE DOUBLE LUMEN TO SASKIA CLEAN,DRY AND INTACT. WILL ENDORSE
CARE TO INCOMING NURSE.

## 2021-02-05 NOTE — NUR
EXPLAINED RISKS AND BENEFITS TO PATIENT ABOUT PICC LINE INSERTION. PT
VERBALIZED UNDERSTANDING. SIGNED CONSENT FILED IN PT'S CHART.

## 2021-02-05 NOTE — NUR
SPOKE TO NP ASHKAN REGARDING PLT RANGE LEVEL AT 67. AND INQUIRING  ABOUT
PLATELET TRANSFUSION PER NP, TRANSFUSION NOT REQUIRED.

## 2021-02-05 NOTE — NUR
RECIEVED PT FROM NIGHT SHIFT NURSE CALM AND COMFORTABLE IN BED, EASILY
AROUSABLE AAOX4. ON 15L NRB % FIO2, O2 SATURATION AT 96% NO RESPIRATORY
DISTRESS NOTED AT THIS TIME. IV SITE TO RT WRIST AREA.  ALL NEEDS MET. BED AT
LOWEST POSTION.  CALL LIGHT WITHIN REACH. WILL CONTINUE TO MONITOR.

## 2021-02-05 NOTE — NUR
ORDER FROM  FOR PICC LINE PLACEMENT TO RT SIDE UPPER ARM REGION
ONLY. ORDER READ BACK AND VERIFIED. NOTED AND CARRIED OUT.

## 2021-02-05 NOTE — NUR
PT ASLEEP IN BED, EASILY AROUSABLE. DENIES PAIN AT THIS TIME. ON 15L NRB AND
HI FLOW 35L, FIO2 100%. NO RESPIRATORY DISTRESS AT THIS TIME. ALL NEEDS MET.
GAVE REPORT TO AND ENDORSED CARE TO AM NURSE.

## 2021-02-06 VITALS — DIASTOLIC BLOOD PRESSURE: 76 MMHG | SYSTOLIC BLOOD PRESSURE: 124 MMHG

## 2021-02-06 VITALS — DIASTOLIC BLOOD PRESSURE: 57 MMHG | SYSTOLIC BLOOD PRESSURE: 133 MMHG

## 2021-02-06 VITALS — SYSTOLIC BLOOD PRESSURE: 141 MMHG | DIASTOLIC BLOOD PRESSURE: 69 MMHG

## 2021-02-06 VITALS — SYSTOLIC BLOOD PRESSURE: 110 MMHG | DIASTOLIC BLOOD PRESSURE: 71 MMHG

## 2021-02-06 LAB
BASOPHILS NFR BLD: 0.2 % (ref 0.2–1.5)
BUN SERPL-MCNC: 133 MG/DL (ref 7–18)
CALCIUM SERPL-MCNC: 8.1 MG/DL (ref 8.5–10.1)
CHLORIDE SERPL-SCNC: 92 MMOL/L (ref 98–107)
CO2 SERPL-SCNC: 22.9 MMOL/L (ref 21–32)
CREAT SERPL-MCNC: 5.8 MG/DL (ref 0.7–1.3)
ERYTHROCYTE [DISTWIDTH] IN BLOOD BY AUTOMATED COUNT: 13.8 % (ref 12.1–16.2)
GFR SERPLBLD BASED ON 1.73 SQ M-ARVRAT: 10 ML/MIN
GLUCOSE SERPL-MCNC: 225 MG/DL (ref 74–106)
PLATELET # BLD: 96 X10^3MCL (ref 152–348)
POTASSIUM SERPL-SCNC: 4.1 MMOL/L (ref 3.5–5.1)
RBC MORPH BLD: NORMAL
SODIUM SERPL-SCNC: 130 MMOL/L (ref 136–145)

## 2021-02-06 PROCEDURE — 5A1D70Z PERFORMANCE OF URINARY FILTRATION, INTERMITTENT, LESS THAN 6 HOURS PER DAY: ICD-10-PCS | Performed by: HOSPITALIST

## 2021-02-06 NOTE — NUR
PT COMPLAINING OF PAIN IN BACK OF THE MOUTH. PT GIVEN TYLENOL PER REQUEST.
WILL CONTINUE TO MONITOR AT THIS TIME

## 2021-02-06 NOTE — NUR
I HAVE OVERVIEWED AND OVERSEEN DOCUMENTATION AND CARE GIVEN OUT BY PRECEPTEE
IVAN. I AGREE WITH CHARTING AND DOCUMENTATION.

## 2021-02-06 NOTE — NUR
RECEIVED PT FROM DAY SHIFT RN. PT RESTING IN BED WITH NO DISTRESS NOTED. PT IS
CURRENTLY ON 15L NRB AND 35L HIGHFLOW WITH O2 SAT @93%. PT HAS DIMINISHED LUNG
SOUNDS AND A COUGH. PROVIDED PT WITH MUCINEX FOR COUGH. PT DENIES ANY CHEST
PAIN OR SHORTNESS OF BREATH AT THIS TIME. PT IS ON TELE #24 NSR-BBB. PT HAS A
RIGHT PICC LINE AND RIGHT WRIST IV. PTS PICC LINE IS INFUSING TPN @70MLS/HR.
ALL SAFETY MEASURES IN PLACE. BED IN LOWEST POSITION. 2 SIDE RAILS UP. CALL
LIGHT WITHIN REACH. WILL CONTINUE TO MONITOR.

## 2021-02-06 NOTE — NUR
PT SLEPT THROUGH THE NIGHT WITH LITTLE PROBLEM OR COMPLAINTS. PT HAS A COUGH
THAT IS UNRELIEVED BY THE MUCINEX Q12HR. EDUCATED PT ON THE BENEFITS OF LAYING
PRONE HOWEVER PT REFUSED AND CONTINUES TO LAY ON HIS BACK. PT HAS DIMINISHED
BREATH SOUNDS AND IS CURRENTLY ON 35L HIGHFLOW AND 15L NRB. PT IS ON TELE #24
WITH SR-BBB. NO DISTRESS NOTED AT THIS TIME. PT DENIES ANY PAIN OR SHORTNESS
OF BREATH. PT HAS A RIGHT PICC LINE AND RIGHT WRIST IV BOTH ARE PATENT. PT IS
COVID+ AND ALL ISOLATION PRECAUTIONS FOLLOWED. ALL SAFETY MEASURES IN PLACE.
BED IN LOWEST POSITION. CALL LIGHT WITHIN REACH. WILL CONTINUE TO MONITOR.

## 2021-02-06 NOTE — NUR
RECEIVED PT FROM DAY RN. PT SUPINE IN LOW FOWLERS ON HI FLOW AT 40L AND 15L
NRB. PT TOLERATING WELL AND DENIES SOB OR CHEST PAIN. LUNG SOUNDS DIMINISHED
BILATERALLY. BOWEL SOUNDS X4 NORMOACTIVE. PT HAS R HAND IV SALINE LOCKED AND A
SASKIA PICC X2 LUMEN RUNNING PPN AND TOLERATING WELL. SKIN INTACT PT ABLE TO
REPOSITION SELF BUT ON BEDREST AT THIS TIME. PT USES BEDSIDE URINAL. PULSES
STRONG IN BUE AND BLE. PT REQUESTING WATER WHICH WAS PROVIDED. ALL OTHER NEEDS
MET AT THIS TIME, BED LOW AND LOCKED WITH CALL LIGHT IN REACH. WILL CONTINUE
TO MONITOR AND OVERSEE CRE GIVEN BY PRECEPTEE IVAN

## 2021-02-06 NOTE — NUR
RECEIVED PT FROM PM SHIFT. PT IS AWAKE AND IN NO ACUTE DISTRESS AT THIS TIME.
PT STILL CURRENTLY UNDERGOING HD. PT IS A/OX4. ABLE TO MAKE NEEDS KNOWN.
DENIES HA/DIZZINESS. LUNG SOUNDS DIMINISHED. BREATHING E/U ON 15L NRB AND 35L
HIGH FLOW. TELE #24. DENIES CP. PALPABLE PULSES. NO EDEMA NOTED. ACTIVE BSX4.
ABD SOFT AND ROUND. DENIES N/V/D. VOIDS FREELY. URINAL BY BEDSIDE. SHUNT TO
LFA. SKIN INTACT. NO C/O PAIN AT THIS TIME. IV TO R WRIST AND PICC LINE TO
SASKIA. BED AT LOWEST POSITION. CALL BUTTON WITHIN REACH. WILL CONTINUE TO
MONITOR.

## 2021-02-07 VITALS — DIASTOLIC BLOOD PRESSURE: 82 MMHG | SYSTOLIC BLOOD PRESSURE: 160 MMHG

## 2021-02-07 VITALS — DIASTOLIC BLOOD PRESSURE: 70 MMHG | SYSTOLIC BLOOD PRESSURE: 124 MMHG

## 2021-02-07 VITALS — SYSTOLIC BLOOD PRESSURE: 128 MMHG | DIASTOLIC BLOOD PRESSURE: 41 MMHG

## 2021-02-07 VITALS — DIASTOLIC BLOOD PRESSURE: 81 MMHG | SYSTOLIC BLOOD PRESSURE: 148 MMHG

## 2021-02-07 LAB
BASOPHILS NFR BLD: 0 % (ref 0.2–1.5)
BUN SERPL-MCNC: 122 MG/DL (ref 7–18)
CALCIUM SERPL-MCNC: 8.2 MG/DL (ref 8.5–10.1)
CHLORIDE SERPL-SCNC: 91 MMOL/L (ref 98–107)
CO2 SERPL-SCNC: 20.1 MMOL/L (ref 21–32)
CREAT SERPL-MCNC: 5.4 MG/DL (ref 0.7–1.3)
ERYTHROCYTE [DISTWIDTH] IN BLOOD BY AUTOMATED COUNT: 14.2 % (ref 12.1–16.2)
GFR SERPLBLD BASED ON 1.73 SQ M-ARVRAT: 11 ML/MIN
GLUCOSE SERPL-MCNC: 395 MG/DL (ref 74–106)
MAGNESIUM SERPL-MCNC: 2.3 MG/DL (ref 1.8–2.4)
PHOSPHATE SERPL-MCNC: 5.9 MG/DL (ref 2.5–4.9)
PLATELET # BLD: 79 X10^3MCL (ref 152–348)
POTASSIUM SERPL-SCNC: 4.2 MMOL/L (ref 3.5–5.1)
RBC MORPH BLD: NORMAL
SODIUM SERPL-SCNC: 126 MMOL/L (ref 136–145)

## 2021-02-07 NOTE — NUR
NO ACUTE DISTRESS NOTED DURING SHIFT. PT WAS COMPLIANT THROUGHOUT SHIFT AND
DID NOT REMOVE EQUIPMENTS. WILL ENDORSE CARE TO PM SHIFT FOR CONTINUITY OF
CARE.

## 2021-02-07 NOTE — NUR
Davy: This lady finally showed.  I repeated her CA-125 just to see what it is doing.  I don't feel this is cancer.  If you can convince her to have surgery go for it. I AM OVERSEEING CARE AND CHARTING BY PRECEPTEE IVAN SHAY. PT ON 40L HIGH
FLOW AND 15L NRB SATURATIO 97%. PT STATES HE IS COUGHING-PLAN ON GIVVING
MUCINEX PRN WITH PM MEDICATIONS. R WRIST IV INFILTRATED. WILL REMOVE AND
REPLACE. PICC IN SASKIA RUNNING PPN-PHARMACY ADJUSTED
PPN DURING AM SHIFT DUE TO LAB
RESULTS. PT REFUSING TO PRONE AT THIS TIME, PRESENTS WITH MILD SOB WITH SPEECH
AND DIMINISHED LUNG SOUNDS. PT ABLE TO MOVE SELF INDEPENDENTLY IN BED. WILL
CONTINUE TO MONITOR AT THIS TIME AND OVERSEE CARE

## 2021-02-07 NOTE — NUR
PT DESATURATION TO 50'S. FOUND PT IN ROOM WITH BOTH MASKS OFF AND UNABLE TO
PUT BACK ON. REPLACED MASKS BACK ON PTS FACE AND SAT IN HIGH FOWLERS. PT
PRESENTED WITH SOB BUT WAS ABLE TO RECOVER AND SATURATION REACHED BACK TO
95%. PT EDUCATED THAT HE CAN NOT TAKE THE MASK OFF AND PT NODDED IN
UNDERSTANDING. WILL CONTINUE TO MONITOR AT THIS TIME

## 2021-02-07 NOTE — NUR
SPOKE TO DIONISIO LUTHER ABOUT PATIENT'S TRENDING BLOOD GLUCOSE LEVEL. SHE SAID SHE
WILL ORDER A LONG ACTING INSULIN FOR THE PT.

## 2021-02-07 NOTE — NUR
RECEIVED PT FROM PM SHIFT NURSE. PT IS RESTING AND IN NO ACUTE DISTRESS AT
THIS TIME. PT IS A/OX4. ABLE TO MAKE NEEDS KNOWN. DENIES HA/DIZZINESS. LUNG
SOUNDS DIMINISHED. PT IS NOW ON 15L NRB AND 40L HIGH FLOW. SOB ON EXERTION OR
WHEN HE REMOVES EQUIPMENTS. TELE #24. DENIES CP. PALPABLE PULSES. NO EDEMA
NOTED. ACTIVE BSX4. ABD SOFT AND ROUND. DENIES N/V/D. VOIDS FREELY. URINAL BY
BEDSIDE. AV SHUNT NOTED TO PAUL. THRILL, BRUIT PRESENT. HD PT. LAST HD ON
2/6/21 WITH 2.2L OUT. GENERALIZED WEAKNESS NOTED. SKIN INTACT. NO C/O PAIN AT
THIS TIME. IV TO R WRIST. SL. PICC LINE TO SASKIA RECEIVING TPN AT 70 ML/HR. BED
AT LOWEST POSITION. CALL BUTTON WITHIN REACH. WILL CONTINUE TO MONITOR.

## 2021-02-07 NOTE — NUR
PT SLEPT THROUGH MOST OF THE NIGHT. NO COMPLAINTS OF COUGH THROUGHOUT NIGHT.
PT DESATS VERY QUICKLY WHEN HE DOES NOT HAVE BOTH HIGHFLOW AND NRB ON. PT
TAKES MASKS OFF SEVERAL TIMES DURING THE NIGHT AND HAS TO BE REMINDED ON THE
IMPORTANCE OF WEARING THE MASK. NO ACUTE DISTRESS NOTED AT THIS TIME. PT
DENIES ANY CHEST PAIN OR SOB. PT IS ON TELE#24 SR-BBB. PT IS CURRENTLY
INFUSING TPN. PT HAD INCREASING BLOOD SUGARS THROUGHOUT THE NIGHT WITH THIS
MORNING BEING 400 AND COVERAGE OF 15 UNITS. WILL REC THAT DAY SHIFT RN SPEAK
WITH DOCTOR FOR LONG ACTING INSULIN. ALL SAFETY MEASURES IN PLACE. BED IN
LOWEST POSITION. CALL LIGHT WITHIN REACH. WILL ENDORSE PT CARE TO DAY SHIFT
RN.

## 2021-02-07 NOTE — NUR
RECEIVED PT FROM DAY SHIFT RN. PT RESTING IN BED. PT IS CURRENTLY ON 40L
HIGHFLOW AND 15NRB. PT HAS LABORED BREATHING WHEN SPEAKING. PT IS HARD OF
HEARING. PT HAS DIMINISHED BREATH SOUNDS AND A COUGH. PT CURRENTLY IS INFUSING
TPN @70ML/HR THROUGH RIGHT PICC LINE. RIGHT WRIST IV IS INFILTRATED. WILL D/C
AND START A NEW IV. PT HAS LEFT SHUNT AND IS SCHEDULED FOR DIALYSIS. ALL
SAFETY MEASURES IN PLACE. BED IN LOWEST POSITION. CALL LIGHT WITHIN REACH.
WILL CONTINUE TO MONITOR.

## 2021-02-07 NOTE — NUR
PT HAD NO MORE DESATURATION EPISODES. TOLERATING HI FLOW AND NRB WELL WITH
SATURATION 96%. NO COMPLAINTS OF PAIN OR DISTRESS NOTED. WILL CONTINE TO
MONTITOR AND PREPARE TO ENDORSE TO DAY RN

## 2021-02-08 VITALS — SYSTOLIC BLOOD PRESSURE: 153 MMHG | DIASTOLIC BLOOD PRESSURE: 88 MMHG

## 2021-02-08 VITALS — SYSTOLIC BLOOD PRESSURE: 124 MMHG | DIASTOLIC BLOOD PRESSURE: 67 MMHG

## 2021-02-08 VITALS — SYSTOLIC BLOOD PRESSURE: 150 MMHG | DIASTOLIC BLOOD PRESSURE: 60 MMHG

## 2021-02-08 VITALS — SYSTOLIC BLOOD PRESSURE: 155 MMHG | DIASTOLIC BLOOD PRESSURE: 83 MMHG

## 2021-02-08 LAB
BASOPHILS NFR BLD: 0.2 % (ref 0.2–1.5)
BUN SERPL-MCNC: 149 MG/DL (ref 7–18)
CALCIUM SERPL-MCNC: 7.7 MG/DL (ref 8.5–10.1)
CHLORIDE SERPL-SCNC: 91 MMOL/L (ref 98–107)
CO2 SERPL-SCNC: 18.9 MMOL/L (ref 21–32)
CREAT SERPL-MCNC: 6.1 MG/DL (ref 0.7–1.3)
ERYTHROCYTE [DISTWIDTH] IN BLOOD BY AUTOMATED COUNT: 13.7 % (ref 12.1–16.2)
GFR SERPLBLD BASED ON 1.73 SQ M-ARVRAT: 10 ML/MIN
GLUCOSE SERPL-MCNC: 284 MG/DL (ref 74–106)
MAGNESIUM SERPL-MCNC: 2.3 MG/DL (ref 1.8–2.4)
PLATELET # BLD: 84 X10^3MCL (ref 152–348)
POTASSIUM SERPL-SCNC: 4.2 MMOL/L (ref 3.5–5.1)
SODIUM SERPL-SCNC: 124 MMOL/L (ref 136–145)

## 2021-02-08 PROCEDURE — 5A1D70Z PERFORMANCE OF URINARY FILTRATION, INTERMITTENT, LESS THAN 6 HOURS PER DAY: ICD-10-PCS | Performed by: HOSPITALIST

## 2021-02-08 NOTE — NUR
PATIENT LYING SUPINE SEMI MAGALLON. A/O X4. FOLLOW COMMAND BUT HARD
TO COMMUNICATE HIS NEEDS DUE TO THE NOISE AND NRB MASK. PREVIOUS DAY NURSE
MENTIONED THAT HE HAD HARD OF HEARING. TELE #24. NO EDEMA. PULSES MODERATE ON
BUE AND WEAK ON BLE. ON 15 L NRB AND 40 L HIGHFLOW N/C. DIMINISHED LUNG
SOUNDS. LAST BM IS 2/8/21.  VOIDS ON URINAL. GENERALIZED WEAKNESS. BRUISING
NOTED ON HIS RIGHT ARM. ON PPN RUNNING ON PICC PURPLE PORT. PICC LINE DOUBLE
LUMEN INTACT: RED PORT PATENT AND PURPLE PORT RUNNING 70 ML/HR PPNS.
PERIPHERAL IV ON RIGHT HAND 24G. PATIENT NON COMPLIENT BY REMOVING HIS NRB
MASK OFTEN. BED AT THE LOWEST POSITION. CALL ILGHT WITHIN REACH. WILL CONTINUE
TO MONITOR.

## 2021-02-08 NOTE — NUR
SPOKE WIT CARMEN DAUGHTER ON THE PHONE AND GAVE UPDATE. PATIENT AHS BEEN A BIT
 AGITATED WITH STAFF AND HE HAS BEEN RESISTANT TO CARE AT TIMES. PER REPORT
AURA ADHIKARI NO KEEP THE MASK ON AND KEEPS REMOVING IT. HE HAS KEEP IT ON NOW
BUT IS COVERING HIS HEAD WITH THE SHEET AND HAS BEEN  WITH THE MASK NEARLY OFF
HIS FACE. HE SO FAR HAS NOT ACUTELY DESATURATED AND MAINTAINED HIS SATURATEION
ABOVE 92%. HE HAS BEEN ON BEDREST AND DUE FOR DIALYSIS TODAY. PATIENT HAS
OFFERED AND HE TOOK HIS MEDICATIONS.

## 2021-02-08 NOTE — NUR
PT DESATURATED AGAIN TO 73%. PT FOUND WITH MASK OFF. PT REPOSITIONED AND MASK
PUT BACK ON. NOW SATING 94%. EDUCATED PT AGAIN ON IMPORTANCE OF NOT TAKING
MASK OFF. PT NONCOMPLINANT. WILL CONTINUE TO MONITOR. WILL INFORM DAY SHIFT RN
ABOUT ISSUE.

## 2021-02-08 NOTE — NUR
PATIENT IS DESTATURATING ADN WNE TO THE ROOM WHERE THE PATIENT
IS HAVING DIALYSIS AT THIS TIME. THE HIGH FLOW CANNULA IS ONLY IN ONE NAREAND
THE NON REBREATHER IS STREACHED OUT DUE TO HIS PULLING ATTHEMASK ANDSOME ONE
EVEN NOTED THE BAND TO THE MASK, MAKING ADJUSTMENT IMPOSSIBLE. STAFF PLACED A
KNOT TO THE STRING AND THE FIT IS IMPROVED AGAINWITH THE SATURATION AT 90.
PULLED FROM THE PIXAS ANOTHER MASK AND WILL APPLY. PATIETNIS STABLEAT THIS
TIME.WILL CONOTINUE TO MONITOR.
 
 
AT THIS TIME.

## 2021-02-08 NOTE — NUR
1. Continue with CCHO and Renal diet as tolerated.
2. Recommend continue Nepro BID for additional 850kcal and 38.2g protein to
aid PO
intake.
3. If patient continues to refuse oral intake, consider TPN with goal rate
65ml/hr with added 10% lipid to provide: 390gm Dextrose, 78gm AA, and 264mls
10% lipids. (total 1902 kcals)

## 2021-02-08 NOTE — NUR
CONTINUED ON DIALSYS AND TOLERATED WELL. WENT TO GIVE THE INSULIN FOR BLOOD
SUGAR  AND PATIENT AGAIN HAD HIS NON REBREATHER OFF AND NEEDED TO HAVE
STAFF REMINDED HIM TO PUT IT BACK ON AGAIN. DIALYSIS TO TAKE OUT 2500 AND
PATIENT IS AT TIME SATURATING LOW BUT THE BP AND PULSE ARE GOOD. THE PATIENT
REMAINS ON HIGH FLOW AS ORDERED WITH THE NON REBREATHER.

## 2021-02-08 NOTE — NUR
PT HAD DESATURATION TO 70%. PT FOUND IN ROOM TAKING OFF NON REBREATHER. PT
REPOSITIONED AND MASK REPLACED. SATURATION RETURNED TO 95%. PT EDUCATED ON
NEED TO KEEP MASK ON. PT NON COMPLIANT WITH EDUCATION AND NOT RESPONDING TO
UNDERSTANDING. WILL CONTINUE TO MONTITOR CLOSELY AND REPORT TO CHARGE RN

## 2021-02-08 NOTE — NUR
RECEIVED PATIENT WITH THE 02  MASK OFF THE MOUTH AND PATIENT WITH SEVERAL TELE
REMOVED. HE HAS BEEN AGITATED AT TIMES AND HAS BEEN NON COMPLIANT LEADING
STAFF TO WARN HIM SEVERAL TIMES TO KEEP HIS 02 ON AS MUCH AS POSIBLE. PATIENT
HAS IV WITH  TPN RUNNING AND HAS BEEN WITH GLUCOSE CHECKS ADN NOTED ELEVATED
REGULAR BLOOD SUGAR. PATIENT AHS NOT HAD A BM YET TODAY BT REPORTE DTHE STOOL
WAS DARK AND REDDISH. PATIENT [facility] LABS NOTED AT 10.6/31, BUN .0 AND THE
CREAIININE AT 6.1, HE HAS CA AT 7.7 AND PAITENTASH BEEN WITH WBC AT 12.3,.
PATIENASH A PICC LINE TO [facility] RIGHT UPPER ARM AND A 24 GAUGE TO THE RIGHT HAND
UPPER ARM. PATIEN LES DIALYSIS PLANNED PER REPORT FOR TODAY. HISTORY FO HTN,
DIABETES . HE HAS BEEN ON AMBUATLRY AND HAS THE SHUNT IS INTACT TO THE ARM.
VITALS AT THIS TIME AT 97.3, 86, 16, 124/67, 96%.

## 2021-02-08 NOTE — NUR
PT HAD BM. FULL LINEN CHANGE DONE. NEW IV PLACED IN
R HAND, PT TOLERATED WELL. NO COMPLAINTS OF PAIN OR ACUTE DISTRESS. WILL
CONTINUE TO MONITOR AT THIS TIME

## 2021-02-08 NOTE — NUR
BLOOD SUGAR AT THIS TIME  AND GAVE 9 UNITS OF REGULAR AS ORDERED.
EDWARD HAS BEEN PEROIDICALY REMOVINGHIS MASK AND WHEN RT SHOUWED UP
TO PLACE BI PAP, ADVSIED THE TECH ABOUT PATIENT AND HE WILL HOLD OFF ON TREAT-
MENT AS INDICATED.

## 2021-02-08 NOTE — NUR
FULL LINEN CHANGE WITH CNA DUE TO BM. BOWEL MOVEMENT WAS DARK BROWN/RED AND
PASTY. WILL INFORM THE DAY SHIFT RN ABOUT STOOL AND LOW PLT LEVELS.

## 2021-02-08 NOTE — NUR
BIPAP IS ORDERED BUT PATIENT IS COMFORTABLE ON HIGH FLOW CANNULA. REASON PT
DESATURATED AND WAS SOB WAS BECAUSE HE TOOK OFF OXYGEN. ONCE PT PLACED BACK ON
O2, PT LESS TACHYPNEIC. RN AWARE. BIPAP AT BEDSIDE IF NEEDED

## 2021-02-08 NOTE — NUR
PT ASLEEP IN BED. NO FURTHER DESATURATION AFTER THE EP AND REEDUCATION ON
IMPORTANCE OF WEARING BOTH OXYGEN MASKS. PT IS CURRENTLY ON 15L NRB AND 40L
HIGHFLOW. PT HAS O2SAT OF 96%. PTS BLOOD SUGAR LEVELS CONTINUED TO TREND HIGH
THROUGHOUT THE NIGHT WITH LAST ONE BEING 312 AT 0600. ADMINISTERED 12UNITS OF
REG R PER SLIDING SCALE. PT WAS ALSO PRESCRIBED LANTUS 10UNITS. WILL INFORM
DAY SHIFT RN OF HIGH BLOOD SUGARS. PT IS CURRENTLY INFUSING TPN @70MLS/HR
THROUGH RIGHT PICC LINE. PT HAS NEW IV IN RIGHT HAND 24G. IV IS PATENT AND
INTACT. NO ACUTE DISTRESS NOTED AT THIS POINT. PT DENIES ANY CHEST PAIN. ALL
SAFETY AND ISOLATION PRECAUTIONS IN PLACE. BED IN LOWEST POSITION. CALL LIGHT
WITHIN REACH. WILL CONTINUE TO MONITOR.

## 2021-02-09 VITALS — DIASTOLIC BLOOD PRESSURE: 71 MMHG | SYSTOLIC BLOOD PRESSURE: 98 MMHG

## 2021-02-09 VITALS — SYSTOLIC BLOOD PRESSURE: 89 MMHG | DIASTOLIC BLOOD PRESSURE: 47 MMHG

## 2021-02-09 VITALS — DIASTOLIC BLOOD PRESSURE: 81 MMHG | SYSTOLIC BLOOD PRESSURE: 160 MMHG

## 2021-02-09 VITALS — SYSTOLIC BLOOD PRESSURE: 150 MMHG | DIASTOLIC BLOOD PRESSURE: 79 MMHG

## 2021-02-09 VITALS — DIASTOLIC BLOOD PRESSURE: 82 MMHG | SYSTOLIC BLOOD PRESSURE: 162 MMHG

## 2021-02-09 LAB
BASOPHILS NFR BLD: 0.1 % (ref 0.2–1.5)
BUN SERPL-MCNC: 101 MG/DL (ref 7–18)
CALCIUM SERPL-MCNC: 8.5 MG/DL (ref 8.5–10.1)
CHLORIDE SERPL-SCNC: 94 MMOL/L (ref 98–107)
CO2 SERPL-SCNC: 22.2 MMOL/L (ref 21–32)
CREAT SERPL-MCNC: 4.6 MG/DL (ref 0.7–1.3)
ERYTHROCYTE [DISTWIDTH] IN BLOOD BY AUTOMATED COUNT: 13.7 % (ref 12.1–16.2)
GFR SERPLBLD BASED ON 1.73 SQ M-ARVRAT: 14 ML/MIN
GLUCOSE SERPL-MCNC: 139 MG/DL (ref 74–106)
MAGNESIUM SERPL-MCNC: 2.2 MG/DL (ref 1.8–2.4)
PHOSPHATE SERPL-MCNC: 5.6 MG/DL (ref 2.5–4.9)
PLATELET # BLD: 83 X10^3MCL (ref 152–348)
POTASSIUM SERPL-SCNC: 3.6 MMOL/L (ref 3.5–5.1)
SODIUM SERPL-SCNC: 132 MMOL/L (ref 136–145)

## 2021-02-09 PROCEDURE — 5A1955Z RESPIRATORY VENTILATION, GREATER THAN 96 CONSECUTIVE HOURS: ICD-10-PCS | Performed by: HOSPITALIST

## 2021-02-09 PROCEDURE — 0BH17EZ INSERTION OF ENDOTRACHEAL AIRWAY INTO TRACHEA, VIA NATURAL OR ARTIFICIAL OPENING: ICD-10-PCS | Performed by: HOSPITALIST

## 2021-02-09 NOTE — NUR
BLOOD SUGAR AT THIS TIME  ANDNO COVERAGE WAS INICATED. PATIENT GETS
AGITATED BECAUSE HE NEEDS TO URINATE. WHEN HE DOES HE  HAS MINIMAL AND GRUNTS
AS IFISIS DIFFICULT TO PASS URINE. WILL CONTINUE TO MONITOR. STILL AWAITING
BED IN ICU.

## 2021-02-09 NOTE — NUR
PATIENT WAS UNABLE TO REST COMFORTABLY. EITHER THE IV PUMP KEPT BEEPING OR
PATIENT TOOK OF HIS NRB MASK. CHANGED TO THE RED PORT OF THE PICC LINE. IT NO
LONGER BEEP. KEPT EDUCATING PAIIENT TO PUT HIS NRB MASK ON. CALL LIGHT
WITHIN REACH. WILL CONTINUE TO MONITOR.

## 2021-02-09 NOTE — NUR
PT RECEIVE LYING ON R SIDE WITH HOB ELEVATED 30 DEGREES AWAKE AND RESTING.  PT
UNMOTIVATD TO PARTICIPATE IN ASSESSMENT.  [place].  A/OX4. TELE 24,  SPO2
91%.  DENIES CHEST PAIN, DIZZINESS, AND LIGHTHEADEDNESS.  RESPIRATIONS EVEN
UNLABORED, 15L NRB + 40L 100%FIO2 HFNC.  PT DENIES SOB AT THIS TIME.  PT
EDUCATED TO MAINTAIN NRB ON AND TO PRONE AS MUCH AS POSSIBLE. NO RESPONSE OT
EDUCATION PROVIDED.  BS ACTIVE, ABD SOFT AND ROUND, NON TENDER, DENIES N/V.
PT IS OLIGURIC, HD ACCESS LAVF, + BRUIT/THRILL.  GENERALIZED WEAKNESS,
BEDREST, ABLE TO SELF TURN.  SKIN INTACT.  DENIES PAIN.  RPICC, 2x LUMEN, BOTH
PORTS ASPIRATE AND FLUSH WITH NO COMLICATIONS, DRESSING C/D/I.  TPN RUNNING TO
RPICC@70ML/HR, NO COMPLICATIONS TO SITE.  BED IN LOWEST POSITION, SIDE RAILS X
2, CALL LIGHT WITHIN REACH.

## 2021-02-09 NOTE — NUR
PATIENT CALLED STAFF INTHE ROOM SEVERAL TIMES AN HOUR ADN NEEDS TO HAVE LITTLE
THINGS ANDHESEEM JUST ANXIOIUS FOR COMPANY. NOWHEWANTSSTFFTOTALK TO
THE FAMILY. WILL CALL THE FAMILY OUTSIDE THE ROOM. PATIENTHASBEENONAND
OFF DESATURATING ANDIT ISMOSTLY DUE TO TE REMOVA OF HIS O2. REMINDED HIME
AGAIN ABOUT THE INDICATION OF NOT DOING THIS AND THE CONSEQUENCES FOR
DOING THIS.

## 2021-02-09 NOTE — NUR
RETURNED TO THE ROOM AGAIN AND PATIENT AGITATED. PATIENT SLOWLY RETURNED TO
90% AND WILL CONTINUED TO AWAIT THE RT FOR POSSIBLE ADJUSTMENT TO THE HIGHFLO

## 2021-02-09 NOTE — NUR
CALLED THERT DUE TO PATIENT IS NOT SATURATING WELL AND IS HIGHLY AGITATED.
SATS AT86 AND NOT MUCH HIGHER. WILL CONINUE TO MONITOR.

## 2021-02-09 NOTE — NUR
SISTER CALLED AND ASKED ABOUT PATIENT'S CONDITION. TOLD SISTER THAT PATIENT
DID NOT EAT HIS MEAL. ALSO, HIS VITAL SIGNS WERE A BIT IN THE HIGH SIDE.
SISTER WOULD LIKE TO RELAY HER WISHES TO PATIENT. WILL TELL TO THE PATIENT.

## 2021-02-09 NOTE — NUR
PATIENT COMPLAINS OF NOSE BLEED. A LARGE CLOT OF BLOOD AND MUCUS NOTED ON THE
BEDSIDE TABLE APPROXIMATELY 2X4 CENITMETERS AND WAS NOT LIQUID BUT MORE SOLID
IN NATURE. HE HAD APPARENTLY COUGHED THIS UP. NOTED TOO THE NARES WITH A
LITTLE CLOTS ANDASSISTED THE PATIENT TO CLEANING OUT THE NARES WITH TISSUE
ANDLIGHT SUCTION WITH A SUCTION CATHETER. PATIENT TOLERATED WELL. ORALY USES
YANKAUR AND SOME SECRETIONS NOTED. NOT MUCH BLOOD ORALLY. PATIENT STATES NOW
HE FEELS BETTER OFFERED WARM BLANKET DUE TO COMPLAINTS OF BEING COLD AND
CHANGED THE NONREBREATHER WITH A NEW ONE DUE TO MASK WAS COUGHED INTO AND SOME
BLOOD NOTED. PATIENT HAS INDICATED HE IS ABLE TO REST NOW. 02 SATURATION AT 91
% FROM 88% AND HOLDING AT THIS TIME. PATIENTIS ANXIOUS AND ASSURED THE PATIENT
STAFF IS NEAR BY FOR ASSISTANCE.

## 2021-02-09 NOTE — NUR
RECEIVED PATIENT AWAKE ANDWITH GARBIED SPEACH AND SEEMS AWAYS AGITATED WITH
NOT FOLLOW DIRRECTIONS. HE HAS SOME HEARING LOSS BUT HE DOES NOT WISH TO
FOLLOW THE INSTRUCION FROM STAFF AND HE HAS BEEN REMINDED MULTIPLE TIMES THE
INSTRUCTIONS FROM STAFF ON HIS PLAN OF CARE. REFUSING TO LEAVE THE 02 IN
PLACE AND WITH CONSTANT PULLING OFF AND STRETCHING OUT THE ELASTIC BAND AND
MAKING A POOR SEAL FOR THE 02. PATIENT HAS BEEN COVERING HIS HEAD WITH A
BLANKET AS WELL.LUNGS ARE DIMINIHSED AND BOWEL SOUNDS ACTIVE AND SOFT.
REPORTED ALARGE BM LAST NIGHT AND ASSISTED WITH FEEDING .PATEINTON BEDREST AND
TOLERATED TURNING HISSELF AND HAS BEEN OUT OF BED AND TO THE BEDSIDE
COMODE.VITALS AT THIS TIME AT97.1, 105,20, 98/71, 90%. TOOK ALL MEDCIATIONS
AND HAS TOLERATED WATER AND INGESTION OF THE PILLS.

## 2021-02-09 NOTE — NUR
PATIENT SEEN BY THE PULMONARY DOCTOR AND AT THAT TIME PATIENT WAS WITH HIGH
RESPIRATIONS ANDCONEINDUTED REFUSAL TO LEAVE THE 02 MASK AND CANNULA IN PLACE.
PATIENT HAS BEEN PULLING OFFREGULARLY THROUGHTOUT HIS STAY AND HAD BEEN
DESATURATIONG OFTEN BECAUSE OF THIS. PATIENT SEEMS TO NOT WANT TO BE COMPLIANT
AND HAS BEEN AGITATED AND EVEN COMBATIVE AND VERBALLY ABUSIVE WITH STAFF.
PATIENT REMINDED REPEATEDLY ABOUT HIS MASK AND THE INDICATIONS AND
CONSEQUENCES FOR NOT KEEPING THE MASK IN PLACE. THE PULMONARY DOCTOR WANTS THE
PATIENT SENT O ICU FOR CLOSER MONITORING. UNFORTUNATELY A BED IS NOT READY FOR
HIM. THE DOCTOR IS SUGGESTING THAT THE PATIENT NEEDS INTUBATION AS WELL.
BASICALLY THE PATIENT NEEDS TO BE SEDATED FOR HIS LACK OF COMPLIANCE. OFFERED
BI PAP BUT THE DOCTOR JUST WANTS HIM SENT TO ICU.

## 2021-02-09 NOTE — NUR
PATIENT UNABLE TO REST COMFORTABLY DUE TO NOISE FROM THE MACHINE AND IV PUMP
INTERMITENTLY GOING OFF. PATIENT DID NOT EAT ANY OF HIS DINNER AND DRINK ONLY
WATER. PPN INFUSED TO PICC LINE (RED PORT) AT 70 ML/HR. PICC LINE WAS BATHED
TO CHG CLOTH AT THE END OF SHIFT. PATIENT ON NUMEROUS OCCATIONS DID NOT PUT
HIS NRB MASK PROPORLY. O2 SAT IN THE LOW 90'S, BUT WHEN HE TOOK OFF THE MASK,
O2 SAT DROPPED TO 80'S. FAMILY CALLED TO ASK FOR UPDATE ALSO. WILL ENDORSE TO
INCOMING NURSE.

## 2021-02-09 NOTE — NUR
MONITOR TECH NOTIFIED PT BRADYCARDIC, UPON LOOKING AT THE MOTIOR PT NOTED TO
BE RUPERT HR 37.  UPON ENTERING ROOM PT FOUND ON THE FLOOR.  HR 34 AND PT NOTED
TO TAKE ONE BREATH.  MONITOR THEN READ 0.  NO PULSES PALPABLE, CODE BLUE
CALLED.  COMPRESSIONS STARTED.
 
DR. GALLAGHER NOTIFIED OF CODE BLUE AND ROSC AND IS CURRENTLY IN ICU
BED 2.  PER MD WILL NOTIFY FAMILY.

## 2021-02-10 VITALS — DIASTOLIC BLOOD PRESSURE: 28 MMHG | SYSTOLIC BLOOD PRESSURE: 127 MMHG

## 2021-02-10 VITALS — DIASTOLIC BLOOD PRESSURE: 45 MMHG | SYSTOLIC BLOOD PRESSURE: 109 MMHG

## 2021-02-10 VITALS — DIASTOLIC BLOOD PRESSURE: 29 MMHG | SYSTOLIC BLOOD PRESSURE: 119 MMHG

## 2021-02-10 VITALS — SYSTOLIC BLOOD PRESSURE: 124 MMHG | DIASTOLIC BLOOD PRESSURE: 68 MMHG

## 2021-02-10 VITALS — SYSTOLIC BLOOD PRESSURE: 130 MMHG | DIASTOLIC BLOOD PRESSURE: 74 MMHG

## 2021-02-10 VITALS — DIASTOLIC BLOOD PRESSURE: 46 MMHG | SYSTOLIC BLOOD PRESSURE: 123 MMHG

## 2021-02-10 VITALS — DIASTOLIC BLOOD PRESSURE: 74 MMHG | SYSTOLIC BLOOD PRESSURE: 123 MMHG

## 2021-02-10 VITALS — DIASTOLIC BLOOD PRESSURE: 36 MMHG | SYSTOLIC BLOOD PRESSURE: 107 MMHG

## 2021-02-10 VITALS — DIASTOLIC BLOOD PRESSURE: 44 MMHG | SYSTOLIC BLOOD PRESSURE: 132 MMHG

## 2021-02-10 LAB
BASOPHILS NFR BLD: 0.1 % (ref 0.2–1.5)
BUN SERPL-MCNC: 133 MG/DL (ref 7–18)
CALCIUM SERPL-MCNC: 8.5 MG/DL (ref 8.5–10.1)
CHLORIDE SERPL-SCNC: 91 MMOL/L (ref 98–107)
CO2 SERPL-SCNC: 21.9 MMOL/L (ref 21–32)
CREAT SERPL-MCNC: 6 MG/DL (ref 0.7–1.3)
ERYTHROCYTE [DISTWIDTH] IN BLOOD BY AUTOMATED COUNT: 14.2 % (ref 12.1–16.2)
GFR SERPLBLD BASED ON 1.73 SQ M-ARVRAT: 10 ML/MIN
GLUCOSE SERPL-MCNC: 462 MG/DL (ref 74–106)
PLATELET # BLD: 97 X10^3MCL (ref 152–348)
POTASSIUM SERPL-SCNC: 4.6 MMOL/L (ref 3.5–5.1)
SODIUM SERPL-SCNC: 128 MMOL/L (ref 136–145)

## 2021-02-10 PROCEDURE — 5A1D70Z PERFORMANCE OF URINARY FILTRATION, INTERMITTENT, LESS THAN 6 HOURS PER DAY: ICD-10-PCS | Performed by: HOSPITALIST

## 2021-02-10 NOTE — NUR
TITRATED LEVOPHED FROM 8 TO 10MCG/MIN D/T LOW BP (88/20). WILL CONTINUE TO
TITRATE AS NEEDED TO MAINTAIN SBP>90 AND MAP OF 65 OR GREATER. PROPOFOL
INCREASED FROM 35 TO 30MCG/MIN AND VERSED FROM 7 TO 8MG/HR D/T ELEVATED HEART
RATE OF 146BPM AND INCREASED RR OF 30. WILL CONTINUE TO TITRATE AS NEEDED TO
ACHIEVE GOAL RSS OF 4.

## 2021-02-10 NOTE — NUR
INITIATED LEVOPHED GTT AT 2MCG/MIN. BP: 127/28 (58), HR: 78BPM, RR: 28, SPO2:
97%. WILL CONTINUE TO TITRATE AS NEEDED.

## 2021-02-10 NOTE — NUR
TITRATED VERSED FROM 5 TO 6MG/HR AND PROPOFOL FROM 35 TO 30MCG/KG/MIN D/T MAP
OF 64, BP: 121/42, HR: 78BPM, RR: 28, SPO2: 97%. WILL CONTINUE TO TITRATE AS
NEEDED.

## 2021-02-10 NOTE — NUR
TITRATED VERSED FROM 4 TO 5MG/HR D/T PT BREATHING OVER VENTILATOR. PT RR:
28, SET RR 20. BP: 133/31, HR: 84BPM, SPO2: 95%.

## 2021-02-10 NOTE — NUR
NOTIFIED NP, AMIRA, OF ELEVATED B, ELEVATED WBC 23, AND REQUESTED
TUBE FEEDING ORDER AND FENTANYL GTT. WILL IMPLEMENT NEW ORDERS AND CONTINUE TO
UPDATED AS NEEDED.

## 2021-02-10 NOTE — NUR
UPDATED DR. ABRAAHM ON PT STATUS AT BEDSIDE. ASKED FOR ANTIBIOTICS FOR ELEVATED
WBC. WILL IMPLEMENT NEW ORDERS AND CONTINUE TO UPDATE AS NEEDED.

## 2021-02-10 NOTE — NUR
GAVE REPORT TO NIGHT SHIFT RN, MELANIE. HD FINISHED AT THIS TIME WITH 2L OUT. BP:
120/69 (83), HR: 141BPM, RR: 29, SPO2: 100%. SASKIA PICC INTACT, DRESSING CDI,
PORTS PATENT. VERSED, PROPOFOL, LEVOPHED, ADN NS IFNUSING THROUGH PICC LINE.
BELLAMY INTACT AND DRAINING VIA GRAVITY. NO BM AT THIS TIME. ETT AND OGT INTACT
AND SECURED. PT IN COMFORTABLE POSITION, SKIN CDI.

## 2021-02-10 NOTE — NUR
INITIATED HD WITH RNCARSON, AT THIS TIME. LEVOPHED INFUSING AT 2MCG/MIN, BP:
119/29, HR: 83, SPO2: 96%, RR: 26.

## 2021-02-10 NOTE — NUR
SPOKE WITH DR. BRIAN, PER DR. ADELA HERR AND VANCO D/CLEMENTE PT'S ALLERGY TO PCN.
START IV MEROPENEM 1G ONCE, THEN 500 MG DAILY. OBTAIN SPUTUM FOR GRAM STAIN
AND CULTURE.

## 2021-02-10 NOTE — NUR
RECEIVED PT FROM NIGHT SHIFT RN. PT IS SEDATED AND INTUBATED, ETT SIZE 7.5CM,
LIP LINE 24 CM, SEDATED ON PROPOFOL AT 45MCG/KG/MIN. LUNG SOUNDS ARE CLEAR,
VENT SETTINGS: RATE 20, , FIO2 100%, PEEP 10. UPON ASSESSMENT PT WAS
FOUND TO BE SATTING AT 78%, RT WAS CALLED AND O2 NOW AT 90%. PICC LINE TO SASKIA
X2 PORTS IN PLACE, FLUSHING WELL, IV TO RH IN PLACE, CDI. L AV FISTULA WITH
+BRUIT/THRILL. PENDING DIALYSIS TODAY. OGT IN PLACE. ABDOMEN SOFT/ROUND. BOWEL
SOUNDS PRESENT, PT RECEIVING PPN AT 70ML/HR. CALL LIGHT IN REACH, WILL
CONTINUE TO MONITOR.

## 2021-02-11 VITALS — DIASTOLIC BLOOD PRESSURE: 65 MMHG | SYSTOLIC BLOOD PRESSURE: 120 MMHG

## 2021-02-11 VITALS — DIASTOLIC BLOOD PRESSURE: 58 MMHG | SYSTOLIC BLOOD PRESSURE: 95 MMHG

## 2021-02-11 VITALS — DIASTOLIC BLOOD PRESSURE: 47 MMHG | SYSTOLIC BLOOD PRESSURE: 89 MMHG

## 2021-02-11 VITALS — DIASTOLIC BLOOD PRESSURE: 46 MMHG | SYSTOLIC BLOOD PRESSURE: 90 MMHG

## 2021-02-11 VITALS — DIASTOLIC BLOOD PRESSURE: 56 MMHG | SYSTOLIC BLOOD PRESSURE: 95 MMHG

## 2021-02-11 VITALS — SYSTOLIC BLOOD PRESSURE: 181 MMHG | DIASTOLIC BLOOD PRESSURE: 84 MMHG

## 2021-02-11 VITALS — DIASTOLIC BLOOD PRESSURE: 54 MMHG | SYSTOLIC BLOOD PRESSURE: 102 MMHG

## 2021-02-11 VITALS — SYSTOLIC BLOOD PRESSURE: 133 MMHG | DIASTOLIC BLOOD PRESSURE: 71 MMHG

## 2021-02-11 VITALS — SYSTOLIC BLOOD PRESSURE: 132 MMHG | DIASTOLIC BLOOD PRESSURE: 77 MMHG

## 2021-02-11 VITALS — DIASTOLIC BLOOD PRESSURE: 65 MMHG | SYSTOLIC BLOOD PRESSURE: 110 MMHG

## 2021-02-11 LAB
BASOPHILS NFR BLD: 0.1 % (ref 0.2–1.5)
BUN SERPL-MCNC: 72 MG/DL (ref 7–18)
CALCIUM SERPL-MCNC: 7.2 MG/DL (ref 8.5–10.1)
CHLORIDE SERPL-SCNC: 95 MMOL/L (ref 98–107)
CO2 SERPL-SCNC: 26.7 MMOL/L (ref 21–32)
CREAT SERPL-MCNC: 4 MG/DL (ref 0.7–1.3)
ERYTHROCYTE [DISTWIDTH] IN BLOOD BY AUTOMATED COUNT: 14.8 % (ref 12.1–16.2)
GFR SERPLBLD BASED ON 1.73 SQ M-ARVRAT: 16 ML/MIN
GLUCOSE SERPL-MCNC: 119 MG/DL (ref 74–106)
PLATELET # BLD: 98 X10^3MCL (ref 152–348)
POTASSIUM SERPL-SCNC: 3.5 MMOL/L (ref 3.5–5.1)
RBC MORPH BLD: NORMAL
SODIUM SERPL-SCNC: 129 MMOL/L (ref 136–145)

## 2021-02-11 NOTE — NUR
Intervention
1. Recommend to d/c Glucerna. Start Nepro 40 cc/hr to provide 960 cc/1728
kcal/77g protein/698 cc free water.
3. Continue FWF 50 cc Q6hr to provide 200 cc qd (total 898 cc qd).
 
Recommended to Dr. Robledo using residents' phone.

## 2021-02-11 NOTE — NUR
PT BP 87/49, PROPFOL TITRATED TO FROM 30MCG/KG/MIN TO 20 MCG/KG/MIN, LEVOPHED
INITIATED AT THIS TIME @ 2MCG/MIN, WILL COTNINUE TO MONITOR

## 2021-02-11 NOTE — NUR
Follow Up Nutrition Assessment: ICU2 Chaz Rolon - 65/M
 
Dx: Bilateral PNA, Hypoxemia, COVID positive
PMHx: ESRD on HD (TTS), DM, HTN
PSHx: none noted
Labs:
(2/11) WBC: 23.1, H/H: 10.6/32%, Na: 129, B/C: 72/4%
(2/7) WBC 12.3H, H/H 10.6/31L, NA 124L, BUN 149H, CR 6.1H, BG 284H, POCBG
312H, CA 7.7L
(2/2) H/H 12.4/36L, Na 132L, Cl 96L, BUN 123H, Cr 4.8H, BG 153H, POC BG 146H,
Ca 7.5L, (1/30) CRP 13.1H, (1/28) Albumin 3L, *no new lipid panel
Meds: Prilosec, Humulin, Atarax, Colace, Mucinex, Tylenol, Decadron, Norvasc,
Nephro-mirian, Lasix, Zinc sulfate, vitamin D, Lipitor, Neurontin, vitamin C,
Ambien
Diet: started on Glucerna 1.2 40 cc/hr, FWF 50 cc Q6H, CCHO/ Renal diet before
TF Infused: TF started yesterday
PO intake since admission: (2/7) 2 meals refused (2/5) 85/90/95 (2/2) 50/0/85
(2/1) 100/08 (1/29) B: 100%, (2/1) B: 100%, L: 80%, PPN: (Clinimax 4.25% - 10%
200ml)
Ht: 167.64cm/66in Wt: 75.75kg/167lbs BMI: 27kg/m2 Bed scale:
IBW: 64.55kg/142lbs %IBW: 117.36% UBW:
Weights: (2/11) bedscale weight was 77kg
Edema: none noted
Last BM: 2/10, 1 BM QD
Skin: skin intact
Harrison: 16
 
RD note (2/7):
Patient continues HD, last treatment Saturday. On HFNC and NRB, satting in the
high 80s low 90s, not improving per progress note. Not able to visualize
patient but per notes patient uncooperative with proning and expresses for
staff to get out of room. Meal refusals yesterday and does not consistently
eating well. Per NP Royce, had ordered PICC line placement and PPN at 70ml/hr
(providing 857kcals total). With only PPN meeting 43% of estimated needs.
Patient eats well occasionally when off restrictive O2 support. Will monitor
for recommendation adjustments.
 
RD note (2/11)
Per RN notes pt intubated and sedated, on propofol 20 mcg/kg/hr this morning
however was just decreased to 10 mcg, (40 mcg on 2/10), levophed just stopped,
getting dialysis per nephrology recommendation
 
Estimated Nutritional Needs Based on ideal body weight (65kg)
Energy: 0939-8549 kcal (20-25 kcal/kg per COVID guidelines)
Protein:  g/day (1.2-2.0 g/kg for COVID and HD)
Fluid: 500-1000 mL + HD output/day or per MD
 
Nutrition Diagnosis:
1. Increased energy and protein needs r/t viral infection and increased
expenditure a/e/b pt is COVID positive and has ESRD on HD.
 
Intervention
1. Recommend to d/c Glucerna. Start Nepro 40 cc/hr to provide 960 cc/1728
kcal/77g protein/698 cc free water.
3. Continue FWF 50 cc Q6hr to provide 200 cc qd (total 898 cc qd).
 
Recommended to Dr. Robledo using residents' phone.
 
Monitor/Evaluate
Goal: TF to meet at least 75% of estimated nutritional needs.
Monitor: TF tolerance, Labs, GI function, I&Os
F/U in 2-3 days as high risk 2/13-14.

## 2021-02-11 NOTE — NUR
md loomis at bedside, updated on pt's status, md aware of +, CA 7.2,
PHOS 5.6, & WBC @ 23.1, PER MD VERIFY IF PT WILL BE ON ANTICOAGULANTS W/
INTENSIVITIST, HD FOR TOMORROW 2/12

## 2021-02-11 NOTE — NUR
DR. JARA CAME IN TO REPLACE CY COLINDRESERTER D/T A MALFUNCTION OF THE
CATHETER. PROCEDURE WAS COMPLETED WITHOUT COMPLICATIONS AND PT REMAINS STABLE.
HD WILL BEGIN TOMORROW 2/12/21.

## 2021-02-11 NOTE — NUR
MD CASTAÑEDA AT BEDSIDE, UPDATED ON PT'S STATUS, PER MD NO NEW ORDERS, MD AWARE
FIO2 TITRATED TO 95%

## 2021-02-12 VITALS — SYSTOLIC BLOOD PRESSURE: 114 MMHG | DIASTOLIC BLOOD PRESSURE: 62 MMHG

## 2021-02-12 VITALS — SYSTOLIC BLOOD PRESSURE: 89 MMHG | DIASTOLIC BLOOD PRESSURE: 38 MMHG

## 2021-02-12 VITALS — SYSTOLIC BLOOD PRESSURE: 104 MMHG | DIASTOLIC BLOOD PRESSURE: 34 MMHG

## 2021-02-12 VITALS — SYSTOLIC BLOOD PRESSURE: 107 MMHG | DIASTOLIC BLOOD PRESSURE: 50 MMHG

## 2021-02-12 VITALS — DIASTOLIC BLOOD PRESSURE: 59 MMHG | SYSTOLIC BLOOD PRESSURE: 107 MMHG

## 2021-02-12 VITALS — DIASTOLIC BLOOD PRESSURE: 61 MMHG | SYSTOLIC BLOOD PRESSURE: 96 MMHG

## 2021-02-12 VITALS — DIASTOLIC BLOOD PRESSURE: 60 MMHG | SYSTOLIC BLOOD PRESSURE: 120 MMHG

## 2021-02-12 VITALS — SYSTOLIC BLOOD PRESSURE: 106 MMHG | DIASTOLIC BLOOD PRESSURE: 60 MMHG

## 2021-02-12 VITALS — SYSTOLIC BLOOD PRESSURE: 117 MMHG | DIASTOLIC BLOOD PRESSURE: 58 MMHG

## 2021-02-12 VITALS — DIASTOLIC BLOOD PRESSURE: 40 MMHG | SYSTOLIC BLOOD PRESSURE: 112 MMHG

## 2021-02-12 VITALS — SYSTOLIC BLOOD PRESSURE: 91 MMHG | DIASTOLIC BLOOD PRESSURE: 49 MMHG

## 2021-02-12 LAB
BASOPHILS NFR BLD: 0 % (ref 0.2–1.5)
BUN SERPL-MCNC: 99 MG/DL (ref 7–18)
BURR CELLS BLD QL SMEAR: (no result)
CALCIUM SERPL-MCNC: 7.5 MG/DL (ref 8.5–10.1)
CHLORIDE SERPL-SCNC: 95 MMOL/L (ref 98–107)
CO2 SERPL-SCNC: 24.2 MMOL/L (ref 21–32)
CREAT SERPL-MCNC: 5.3 MG/DL (ref 0.7–1.3)
ERYTHROCYTE [DISTWIDTH] IN BLOOD BY AUTOMATED COUNT: 15.2 % (ref 12.1–16.2)
GFR SERPLBLD BASED ON 1.73 SQ M-ARVRAT: 12 ML/MIN
GLUCOSE SERPL-MCNC: 82 MG/DL (ref 74–106)
PLATELET # BLD: 66 X10^3MCL (ref 152–348)
POTASSIUM SERPL-SCNC: 4.2 MMOL/L (ref 3.5–5.1)
RBC MORPH BLD: (no result)
SODIUM SERPL-SCNC: 128 MMOL/L (ref 136–145)

## 2021-02-12 PROCEDURE — 5A1D70Z PERFORMANCE OF URINARY FILTRATION, INTERMITTENT, LESS THAN 6 HOURS PER DAY: ICD-10-PCS | Performed by: HOSPITALIST

## 2021-02-12 NOTE — NUR
STARTED PATIENT'S LEVOPHED AT 2mcg/min, PATIENT STARTED ON LEVOPHED DUE TO
TRENDING LOW BLOOD PRESSURES. MOST RECENT BLOOD PRESSURE IS AT 93/22 (46).

## 2021-02-12 NOTE — NUR
CELESTE AND VERSED STOPPED AT 1615. PATIENT CURRENTLY HAS SOME ABD BREATHING.
WILL CONTINUE TO MONITOR PATIENT.

## 2021-02-12 NOTE — NUR
RECIEVED REPORT FROM [facility] NURSE. RENETTA CURRENTLY OBSERVED IN ICU BED THREE.
PATIENT IS CURRENTLY ON A VENT. VENT IN A/C VC MODE. FI02 %, RATE AT 20,
PEEP AT 14. PATIENT CURRENTLY ON VERSED AT 2MG/HOUR AND FENT AT 1 MCG/KG/HOUR.
PATIENT'S PUPILS PINPOINT < 3MM. PATIENT SIGNIFICANTLY SEDATED AND HAS MINIMAL
RESPONSE TO ORAL AND TRACH SUCTINONING. CURRENT BLOOD PRESSURE AT 97/55. OG
TUBE IN PLACE. ETT TUBE IN PLACE. SAFETY PRECAUTIONS IN PLACE. WILL CONTINUE
TO PROVIDE CAR FOR PATIENT.

## 2021-02-12 NOTE — NUR
PATIENT IS CURRENTLY LYING IN BED AND INTUBATED CURRENTLY OFF ALL SEDATION AT
THIS TIME. SASKIA PICC INTACT AND SECURE. PATIENT HAD DIALYSIS TODAY WITH TWO
LITER REMOVED. PATIENT CURRENTLY ON LEVO AT 4cmg/min. OG FEEDING HELD TODAY
DUE TO SIGNIFICANT GASTRIC RESIDUALS. FEEDING SWITCHED TO NEPRO BY NP WITH
GOAL RATE OF 40cc/HOUR. PATIENT HAS EYES CLOSED. FIO2 REMAINS  ON THE
VENT AT THIS TIME. WILL ENDORSE ALL FURTHER CARE TO THE [facility] NURSE.

## 2021-02-13 VITALS — DIASTOLIC BLOOD PRESSURE: 51 MMHG | SYSTOLIC BLOOD PRESSURE: 109 MMHG

## 2021-02-13 VITALS — DIASTOLIC BLOOD PRESSURE: 55 MMHG | SYSTOLIC BLOOD PRESSURE: 146 MMHG

## 2021-02-13 VITALS — DIASTOLIC BLOOD PRESSURE: 57 MMHG | SYSTOLIC BLOOD PRESSURE: 118 MMHG

## 2021-02-13 VITALS — DIASTOLIC BLOOD PRESSURE: 55 MMHG | SYSTOLIC BLOOD PRESSURE: 126 MMHG

## 2021-02-13 VITALS — DIASTOLIC BLOOD PRESSURE: 57 MMHG | SYSTOLIC BLOOD PRESSURE: 105 MMHG

## 2021-02-13 VITALS — SYSTOLIC BLOOD PRESSURE: 127 MMHG | DIASTOLIC BLOOD PRESSURE: 56 MMHG

## 2021-02-13 VITALS — SYSTOLIC BLOOD PRESSURE: 99 MMHG | DIASTOLIC BLOOD PRESSURE: 53 MMHG

## 2021-02-13 VITALS — DIASTOLIC BLOOD PRESSURE: 51 MMHG | SYSTOLIC BLOOD PRESSURE: 106 MMHG

## 2021-02-13 VITALS — DIASTOLIC BLOOD PRESSURE: 55 MMHG | SYSTOLIC BLOOD PRESSURE: 106 MMHG

## 2021-02-13 LAB
BASOPHILS NFR BLD: 0.2 % (ref 0.2–1.5)
BUN SERPL-MCNC: 89 MG/DL (ref 7–18)
CALCIUM SERPL-MCNC: 6.7 MG/DL (ref 8.5–10.1)
CHLORIDE SERPL-SCNC: 96 MMOL/L (ref 98–107)
CO2 SERPL-SCNC: 21.2 MMOL/L (ref 21–32)
CREAT SERPL-MCNC: 5 MG/DL (ref 0.7–1.3)
ERYTHROCYTE [DISTWIDTH] IN BLOOD BY AUTOMATED COUNT: 15.4 % (ref 12.1–16.2)
GFR SERPLBLD BASED ON 1.73 SQ M-ARVRAT: 12 ML/MIN
GLUCOSE SERPL-MCNC: 196 MG/DL (ref 74–106)
PLATELET # BLD: 117 X10^3MCL (ref 152–348)
POTASSIUM SERPL-SCNC: 5 MMOL/L (ref 3.5–5.1)
RBC MORPH BLD: NORMAL
SODIUM SERPL-SCNC: 132 MMOL/L (ref 136–145)

## 2021-02-13 NOTE — NUR
REPORT GIVEN TO LAURITA DAS. ALL UPDATES REGARDING PT CARE GIVEN. RN MADE AWARE
OF RESIDUAL VOLUMES. PROVIDED ALL UPDATES REGARDING PT CARE.

## 2021-02-13 NOTE — NUR
MD MO, RADIOLOGIST CALMERISSA, PER MD ETT NEEDS TO BE RETRACTED 3-4 CM, IN RT
SIDE MAIN STEM BRONCUS

## 2021-02-13 NOTE — NUR
DIONISIO NIETO AT [facility], UPDATED ON PT'S STATUS, MADE AWARE PT HAS BEEN OBTUNDED
AND OFF SEDATION SINCE 1615 2/12/21, PT HAS HAD NO BM AS WELL, GRV CONTINUE TO
BE HIGH, PENDING NEW ORDERS

## 2021-02-13 NOTE — NUR
DR MENDIETA CALLED FOR PT UPDATE. PROVIDING ALL UPDATES REGARDING PT CARE. NO
NEW ORDERS GIVEN AT THIS TIME.

## 2021-02-14 VITALS — DIASTOLIC BLOOD PRESSURE: 66 MMHG | SYSTOLIC BLOOD PRESSURE: 142 MMHG

## 2021-02-14 VITALS — DIASTOLIC BLOOD PRESSURE: 61 MMHG | SYSTOLIC BLOOD PRESSURE: 156 MMHG

## 2021-02-14 VITALS — DIASTOLIC BLOOD PRESSURE: 64 MMHG | SYSTOLIC BLOOD PRESSURE: 116 MMHG

## 2021-02-14 VITALS — DIASTOLIC BLOOD PRESSURE: 45 MMHG | SYSTOLIC BLOOD PRESSURE: 128 MMHG

## 2021-02-14 VITALS — DIASTOLIC BLOOD PRESSURE: 61 MMHG | SYSTOLIC BLOOD PRESSURE: 109 MMHG

## 2021-02-14 VITALS — SYSTOLIC BLOOD PRESSURE: 156 MMHG | DIASTOLIC BLOOD PRESSURE: 62 MMHG

## 2021-02-14 VITALS — SYSTOLIC BLOOD PRESSURE: 100 MMHG | DIASTOLIC BLOOD PRESSURE: 47 MMHG

## 2021-02-14 VITALS — DIASTOLIC BLOOD PRESSURE: 70 MMHG | SYSTOLIC BLOOD PRESSURE: 145 MMHG

## 2021-02-14 VITALS — SYSTOLIC BLOOD PRESSURE: 110 MMHG | DIASTOLIC BLOOD PRESSURE: 61 MMHG

## 2021-02-14 VITALS — SYSTOLIC BLOOD PRESSURE: 155 MMHG | DIASTOLIC BLOOD PRESSURE: 79 MMHG

## 2021-02-14 LAB
BASOPHILS NFR BLD: 0.4 % (ref 0.2–1.5)
BUN SERPL-MCNC: 131 MG/DL (ref 7–18)
CALCIUM SERPL-MCNC: 6.9 MG/DL (ref 8.5–10.1)
CHLORIDE SERPL-SCNC: 96 MMOL/L (ref 98–107)
CO2 SERPL-SCNC: 21.5 MMOL/L (ref 21–32)
CREAT SERPL-MCNC: 6.6 MG/DL (ref 0.7–1.3)
ERYTHROCYTE [DISTWIDTH] IN BLOOD BY AUTOMATED COUNT: 15.1 % (ref 12.1–16.2)
GFR SERPLBLD BASED ON 1.73 SQ M-ARVRAT: 9 ML/MIN
GLUCOSE SERPL-MCNC: 208 MG/DL (ref 74–106)
PLATELET # BLD: 111 X10^3MCL (ref 152–348)
POTASSIUM SERPL-SCNC: 5.7 MMOL/L (ref 3.5–5.1)
RBC MORPH BLD: NORMAL
SODIUM SERPL-SCNC: 130 MMOL/L (ref 136–145)

## 2021-02-14 PROCEDURE — 5A1D70Z PERFORMANCE OF URINARY FILTRATION, INTERMITTENT, LESS THAN 6 HOURS PER DAY: ICD-10-PCS | Performed by: HOSPITALIST

## 2021-02-14 NOTE — NUR
INITIAL ASSESSMENT COMPLETED SEE PROCESS INTERVENTIONS FOR FURTHER DETAILS.
PATIENT RESTING IN BED AND TOLERATING VENT WITH NO S/S OF PAIN OR DISTRESS
NOTED. PATIENT HAS NO COUGH/NO GAG/NO RESPONSE TO PAINFUL STIMULI. PUPILS ARE
REACTIVE. ITRACE COMPLETED WITH NO S/S OF INFX OR INFILTERATION, NS @ [facility]. Q2H
TURNS INITITATED. ORAL CARE COMPLETED. NO FURTHER NEED AT THIS TIME.
CONTINUOUS CARDIAC MONITOR ATTACHED,VSS.

## 2021-02-14 NOTE — NUR
APPROVED FAMILY MEMBER NAHID (SISTER)
CALLED REGARDING PT CARE UPDATES. UPDATED FAMILY
REGARDING THE PT MENTAL STATUS, NURSING CARE, AND ADDRESSED ANY CONCERNS OF
THE FAMILY.

## 2021-02-14 NOTE — NUR
REPORT GIVEN TO LAURITA FISHER. PROVIDED ALL PT UPDATES REGARDING CARE. NOTIFIED
OF PT NEURO STATUS OFF SEDATION, GI RESIDUAL VOLUMES, HD, ETC. PT CARE HANDED
OFF.

## 2021-02-14 NOTE — NUR
SPOKE WITH DR MENDIETA ON THE PHONE, PROVIDED ALL UPDATES REGARDING PT CARE. NO
NEW ORDERS GIVEN AT THIS TIME.

## 2021-02-14 NOTE — NUR
HD DIALYSIS LAURITA BOATENG AT BEDSIDE. HD STARTED 1100, HR 92, SAO2 92 RR 21, 121/66
(84). WILL CONTINUE TO MONITOR PT.

## 2021-02-14 NOTE — NUR
NP AMIRA AT BEDSIDE. NOTIFIED OF AM LAB RESULTS, WBC INC TO 32.9, K IS 5.7,
, CR 6.6. NOTIFIED NP OF NO BM SINCE 2/8, HIGH RESIDUAL. NOTIFIED OF
NEURO STATUS.
NO
ORDERS RECEIVED AT THIS TIME

## 2021-02-15 VITALS — DIASTOLIC BLOOD PRESSURE: 69 MMHG | SYSTOLIC BLOOD PRESSURE: 153 MMHG

## 2021-02-15 VITALS — SYSTOLIC BLOOD PRESSURE: 146 MMHG | DIASTOLIC BLOOD PRESSURE: 78 MMHG

## 2021-02-15 VITALS — SYSTOLIC BLOOD PRESSURE: 139 MMHG | DIASTOLIC BLOOD PRESSURE: 66 MMHG

## 2021-02-15 VITALS — DIASTOLIC BLOOD PRESSURE: 69 MMHG | SYSTOLIC BLOOD PRESSURE: 139 MMHG

## 2021-02-15 VITALS — SYSTOLIC BLOOD PRESSURE: 17 MMHG | DIASTOLIC BLOOD PRESSURE: 69 MMHG

## 2021-02-15 VITALS — SYSTOLIC BLOOD PRESSURE: 153 MMHG | DIASTOLIC BLOOD PRESSURE: 77 MMHG

## 2021-02-15 VITALS — SYSTOLIC BLOOD PRESSURE: 133 MMHG | DIASTOLIC BLOOD PRESSURE: 64 MMHG

## 2021-02-15 VITALS — SYSTOLIC BLOOD PRESSURE: 115 MMHG | DIASTOLIC BLOOD PRESSURE: 76 MMHG

## 2021-02-15 VITALS — DIASTOLIC BLOOD PRESSURE: 76 MMHG | SYSTOLIC BLOOD PRESSURE: 150 MMHG

## 2021-02-15 VITALS — SYSTOLIC BLOOD PRESSURE: 158 MMHG | DIASTOLIC BLOOD PRESSURE: 76 MMHG

## 2021-02-15 VITALS — DIASTOLIC BLOOD PRESSURE: 77 MMHG | SYSTOLIC BLOOD PRESSURE: 172 MMHG

## 2021-02-15 LAB
BASOPHILS NFR BLD: 0.4 % (ref 0.2–1.5)
BUN SERPL-MCNC: 94 MG/DL (ref 7–18)
CALCIUM SERPL-MCNC: 7.6 MG/DL (ref 8.5–10.1)
CHLORIDE SERPL-SCNC: 95 MMOL/L (ref 98–107)
CO2 SERPL-SCNC: 28.6 MMOL/L (ref 21–32)
CREAT SERPL-MCNC: 4.7 MG/DL (ref 0.7–1.3)
ERYTHROCYTE [DISTWIDTH] IN BLOOD BY AUTOMATED COUNT: 15.2 % (ref 12.1–16.2)
GFR SERPLBLD BASED ON 1.73 SQ M-ARVRAT: 13 ML/MIN
GLUCOSE SERPL-MCNC: 310 MG/DL (ref 74–106)
PLATELET # BLD: 38 X10^3MCL (ref 152–348)
POTASSIUM SERPL-SCNC: 3.5 MMOL/L (ref 3.5–5.1)
SODIUM SERPL-SCNC: 134 MMOL/L (ref 136–145)

## 2021-02-15 NOTE — NUR
MD AWARE OF +BLOOD CULTURES. AM PHARMACIST TO DOSE VANCOMYCIN. WILL NOTIFIED
AM NURSE DURING SHIFT CHANGE.

## 2021-02-15 NOTE — NUR
INITIAL ASSESSMENT COMPLETED, SEE PROCESS INTERVENTIONS FOR FURTHER DETAILS.
PATIENT RESTING IN BED AND TOLERATING VENT WITH NO S/S OF PAIN OR DISTRESS
NOTED AT THIS TIME. ITRACE COMPLETED WITH NO S/S OF INFX OR INFILTERATION NS @
[facility]/HR. Q2H TURNS INITIATED. ORAL CARE PROVIDED. NO FURTHER NEEDS. CONTINUOUS
CARDIAC MONITOR ATTACHED, VSS.

## 2021-02-15 NOTE — NUR
1. Continue Nepro 40 cc/hr to provide 960 cc/1728 kcal/77g protein/698 cc free
water.
2. Continue FWF 50 cc Q6hr to provide 200 cc qd (total 898 cc qd).

## 2021-02-15 NOTE — NUR
Follow Up Nutrition Assessment: ICU2 Chaz Rolon - 65/M
 
Dx: Bilateral PNA, Hypoxemia, COVID positive
PMHx: ESRD on HD (TTS), DM, HTN
PSHx: none noted
Labs: (2/15) WBC 13.2H, H/H 10.5/32L, NA 134L, BUN 94H, CR 4.7H, BG 310H,
POCBG 320H, CA 7.6L
(2/11) WBC: 23.1, H/H: 10.6/32%, Na: 129, B/C: 72/4%
Meds: Prilosec, Humulin, Atarax, Colace, Mucinex, Tylenol, Decadron, Norvasc,
Nephro-mirian, Lasix, Zinc sulfate, vitamin D, Lipitor, Neurontin, vitamin C,
Ambien
Diet: started on Nepro 40 ml/hr, FWF 50 cc Q6H
TF Infused: (2/15) 530cc, (2/14) 110cc, (2/13) 0cc (2/12) 30cc
I&Os: (2/15) 840ml (2/14) 396ml (2/13) -1861ml (2/12) 1045ml (2/11) 1964ml
Ht: 167.64cm/66in Wt: 75.75kg/167lbs BMI: 27kg/m2 Bed scale:
IBW: 64.55kg/142lbs %IBW: 117.36% UBW:
Weights: (2/14) 88kg (2/11) weight was 77kg
Edema: +2 edema BUE
Last BM: 2/14
Skin: skin intact, redness to buttock
Harrison: 12
 
Per last RD note (2/11)
Per RN notes pt intubated and sedated, on propofol 20 mcg/kg/hr this morning
however, was just decreased to 10 mcg, (40 mcg on 2/10), levophed just
stopped,
getting dialysis per nephrology recommendation
 
RD note (2/15):
Per RN notes continues to be tolerating tube feeds advancing slowly, residuals
low 20-40 mls replaced over last 2 days.
During visit, Pt seen from window with no sedation, remains on ventilator.
Formula switched to meet estimated needs and nutrient requirements, Nepro.
Currently increased per RN to goal of 40ml/hr. No GI issues, abdomen round but
tolerating TF. Pt being considered for palliative extubation per progress
note, will continue to monitor.
 
Estimated Nutritional Needs Based on ideal body weight (65kg)
Energy: 6337-5320 kcal (20-25 kcal/kg per COVID guidelines)
Protein:  g/day (1.2-2.0 g/kg for COVID and HD)
Fluid: 500-1000 mL + HD output/day or per MD
 
Nutrition Diagnosis:
1. Increased energy and protein needs r/t viral infection and increased
expenditure a/e/b pt is COVID positive and has ESRD on HD. (ongoing)
 
Intervention
1. Continue Nepro 40 cc/hr to provide 960 cc/1728 kcal/77g protein/698 cc free
water.
3. Continue FWF 50 cc Q6hr to provide 200 cc qd (total 898 cc qd).
 
Monitor/Evaluate
Goal: TF to meet at least 75% of estimated nutritional needs (meeting)
Monitor: TF tolerance, Labs, GI function, I&Os
F/U in 2-3 days as high risk 2/17-18

## 2021-02-15 NOTE — NUR
PT HAD SMALL, SOFT BM AT THIS TIME. PT CLEANSED, CHG BATH AND BELLMAY/ANDRE CARE
PROVIDED. PT REMAINS STABLE.

## 2021-02-15 NOTE — NUR
NP, AMIRA AT BEDSIDE. ALL UPDATES PROVIDED INCLUDING LOW PLT CNT, ELEVATED
BUN AND CR AND WBC'S. NO NEW ORDERS AT THIS TIME.

## 2021-02-15 NOTE — NUR
REASSESSMENT COMPLETED, PATIENT STATUS UNCHANGED. TOLERATING VENT WITH NO S/S
OF PAIN OR DISTRESS NOTED. NO FURTHER NEEDS. CONTINUOUS CARDIAC MONITOR
ATTACHED, VSS.

## 2021-02-15 NOTE — NUR
REASSESSMENT COMPLETED, PATIENT STATUS UNCHANGED. TOLERATING VENT. NO S/S OF
PAIN OR DISTRESS. NO FURTHER NEEDS, CONTINUOUS CARDIAC MONITOR ATTACHED, VSS.

## 2021-02-15 NOTE — NUR
PT DESATING INTO THE HIGH 80'S. RT JEAN AT BEDSIDE, FiO2 TITRATED UP TO 85%.
WILL CONT TO MONITOR PT.

## 2021-02-16 VITALS — DIASTOLIC BLOOD PRESSURE: 77 MMHG | SYSTOLIC BLOOD PRESSURE: 139 MMHG

## 2021-02-16 VITALS — SYSTOLIC BLOOD PRESSURE: 91 MMHG | DIASTOLIC BLOOD PRESSURE: 52 MMHG

## 2021-02-16 VITALS — DIASTOLIC BLOOD PRESSURE: 71 MMHG | SYSTOLIC BLOOD PRESSURE: 141 MMHG

## 2021-02-16 VITALS — DIASTOLIC BLOOD PRESSURE: 64 MMHG | SYSTOLIC BLOOD PRESSURE: 139 MMHG

## 2021-02-16 VITALS — DIASTOLIC BLOOD PRESSURE: 66 MMHG | SYSTOLIC BLOOD PRESSURE: 148 MMHG

## 2021-02-16 VITALS — SYSTOLIC BLOOD PRESSURE: 135 MMHG | DIASTOLIC BLOOD PRESSURE: 66 MMHG

## 2021-02-16 VITALS — DIASTOLIC BLOOD PRESSURE: 63 MMHG | SYSTOLIC BLOOD PRESSURE: 130 MMHG

## 2021-02-16 VITALS — DIASTOLIC BLOOD PRESSURE: 69 MMHG | SYSTOLIC BLOOD PRESSURE: 138 MMHG

## 2021-02-16 LAB
BASOPHILS NFR BLD: 0.2 % (ref 0.2–1.5)
BUN SERPL-MCNC: 125 MG/DL (ref 7–18)
CALCIUM SERPL-MCNC: 8 MG/DL (ref 8.5–10.1)
CHLORIDE SERPL-SCNC: 95 MMOL/L (ref 98–107)
CO2 SERPL-SCNC: 27.9 MMOL/L (ref 21–32)
CREAT SERPL-MCNC: 5.8 MG/DL (ref 0.7–1.3)
ERYTHROCYTE [DISTWIDTH] IN BLOOD BY AUTOMATED COUNT: 15.4 % (ref 12.1–16.2)
GFR SERPLBLD BASED ON 1.73 SQ M-ARVRAT: 10 ML/MIN
GLUCOSE SERPL-MCNC: 156 MG/DL (ref 74–106)
PLATELET # BLD: 27 X10^3MCL (ref 152–348)
POTASSIUM SERPL-SCNC: 4 MMOL/L (ref 3.5–5.1)
RBC MORPH BLD: (no result)
SODIUM SERPL-SCNC: 136 MMOL/L (ref 136–145)

## 2021-02-16 NOTE — NUR
REASSESSSMENT COMPLETED, PATIENT STATUS UNCHANGED. TOLERATING VENT WITH NO S/S
OF PAIN OR DISTRESS NOTED. CONTINUOUS CARDIAC MONITOR ATTACHED, VSS.

## 2021-02-16 NOTE — NUR
REASSESSMENT COMPLETED, PATIENT STATUS UNCHANGED. TOLERATING VENT WITH NO S/S
OF PAIN OR DISTRESS NOTED. CONTINUOUS CARDIAC MONITOR ATTACHED, VSS.

## 2021-02-16 NOTE — NUR
1527- PATIENT WAS PALLIATIVE EXTUBATED BY NEHAL HUBER ON MORPHINE DRIP WITH
EXPECTED DEMISE.
1535- I WAS CALLED BEDSIDE AS PATIENT SHOWED ASYSTOLE ON CARDIAC MONITOR. UPON
ASSESSMENT, PATIENT WAS FOUND UNRESPONSIVE WITHOUT A CAROTID
PULSE, PUPILS FIXED AND DILATED, NO RESPIRATIONS FOR ONE MINUTE, AND NO
HEATBEAT NOTED UPON ASCULATION. TIME OF DEATH PRONOUNCED BY MYSELF AT 1535.

## 2021-02-16 NOTE — NUR
1527 - PT EXTUBATED TO COMFORT CARE
1535 - PT ASYSTOLE, ABSENT HEART/LUNG SOUNDS, PUPIL FIXED, HUNG TADEO
CALLED TOD 1535. FAMILY NOTIFIED.
1600 - [facility] NOTIFIED, NO REFERRAL. [phone]
1610 -  CALLED, AWAITING CALL BACK FROM DEPUTY

## 2021-11-07 NOTE — NUR
PT RESTING IN BED. NO ACUTE DISTRESS. Skin normal color for race, warm, dry and intact. No evidence of rash.
